# Patient Record
Sex: MALE | Race: WHITE | NOT HISPANIC OR LATINO | Employment: OTHER | URBAN - NONMETROPOLITAN AREA
[De-identification: names, ages, dates, MRNs, and addresses within clinical notes are randomized per-mention and may not be internally consistent; named-entity substitution may affect disease eponyms.]

---

## 2023-04-23 ENCOUNTER — APPOINTMENT (EMERGENCY)
Dept: CT IMAGING | Facility: HOSPITAL | Age: 63
End: 2023-04-23

## 2023-04-23 ENCOUNTER — HOSPITAL ENCOUNTER (EMERGENCY)
Facility: HOSPITAL | Age: 63
End: 2023-04-24
Attending: EMERGENCY MEDICINE

## 2023-04-23 DIAGNOSIS — S30.1XXA HEMATOMA OF RIGHT FLANK, INITIAL ENCOUNTER: Primary | ICD-10-CM

## 2023-04-23 LAB
ABO GROUP BLD: NORMAL
ABO GROUP BLD: NORMAL
ANION GAP SERPL CALCULATED.3IONS-SCNC: 7 MMOL/L (ref 4–13)
BASOPHILS # BLD AUTO: 0.06 THOUSANDS/ΜL (ref 0–0.1)
BASOPHILS # BLD AUTO: 0.06 THOUSANDS/ΜL (ref 0–0.1)
BASOPHILS NFR BLD AUTO: 0 % (ref 0–1)
BASOPHILS NFR BLD AUTO: 1 % (ref 0–1)
BLD GP AB SCN SERPL QL: NEGATIVE
BUN SERPL-MCNC: 12 MG/DL (ref 5–25)
CALCIUM SERPL-MCNC: 9.6 MG/DL (ref 8.4–10.2)
CHLORIDE SERPL-SCNC: 104 MMOL/L (ref 96–108)
CO2 SERPL-SCNC: 28 MMOL/L (ref 21–32)
CREAT SERPL-MCNC: 0.94 MG/DL (ref 0.6–1.3)
EOSINOPHIL # BLD AUTO: 0.07 THOUSAND/ΜL (ref 0–0.61)
EOSINOPHIL # BLD AUTO: 0.12 THOUSAND/ΜL (ref 0–0.61)
EOSINOPHIL NFR BLD AUTO: 1 % (ref 0–6)
EOSINOPHIL NFR BLD AUTO: 1 % (ref 0–6)
ERYTHROCYTE [DISTWIDTH] IN BLOOD BY AUTOMATED COUNT: 14.6 % (ref 11.6–15.1)
ERYTHROCYTE [DISTWIDTH] IN BLOOD BY AUTOMATED COUNT: 14.6 % (ref 11.6–15.1)
GFR SERPL CREATININE-BSD FRML MDRD: 86 ML/MIN/1.73SQ M
GLUCOSE SERPL-MCNC: 127 MG/DL (ref 65–140)
HCT VFR BLD AUTO: 37.7 % (ref 36.5–49.3)
HCT VFR BLD AUTO: 38.4 % (ref 36.5–49.3)
HGB BLD-MCNC: 12.5 G/DL (ref 12–17)
HGB BLD-MCNC: 12.8 G/DL (ref 12–17)
IMM GRANULOCYTES # BLD AUTO: 0.06 THOUSAND/UL (ref 0–0.2)
IMM GRANULOCYTES # BLD AUTO: 0.07 THOUSAND/UL (ref 0–0.2)
IMM GRANULOCYTES NFR BLD AUTO: 1 % (ref 0–2)
IMM GRANULOCYTES NFR BLD AUTO: 1 % (ref 0–2)
LYMPHOCYTES # BLD AUTO: 1.26 THOUSANDS/ΜL (ref 0.6–4.47)
LYMPHOCYTES # BLD AUTO: 1.42 THOUSANDS/ΜL (ref 0.6–4.47)
LYMPHOCYTES NFR BLD AUTO: 13 % (ref 14–44)
LYMPHOCYTES NFR BLD AUTO: 9 % (ref 14–44)
MCH RBC QN AUTO: 29.9 PG (ref 26.8–34.3)
MCH RBC QN AUTO: 30 PG (ref 26.8–34.3)
MCHC RBC AUTO-ENTMCNC: 33.2 G/DL (ref 31.4–37.4)
MCHC RBC AUTO-ENTMCNC: 33.3 G/DL (ref 31.4–37.4)
MCV RBC AUTO: 90 FL (ref 82–98)
MCV RBC AUTO: 90 FL (ref 82–98)
MONOCYTES # BLD AUTO: 0.61 THOUSAND/ΜL (ref 0.17–1.22)
MONOCYTES # BLD AUTO: 0.67 THOUSAND/ΜL (ref 0.17–1.22)
MONOCYTES NFR BLD AUTO: 5 % (ref 4–12)
MONOCYTES NFR BLD AUTO: 6 % (ref 4–12)
NEUTROPHILS # BLD AUTO: 11.72 THOUSANDS/ΜL (ref 1.85–7.62)
NEUTROPHILS # BLD AUTO: 8.9 THOUSANDS/ΜL (ref 1.85–7.62)
NEUTS SEG NFR BLD AUTO: 78 % (ref 43–75)
NEUTS SEG NFR BLD AUTO: 84 % (ref 43–75)
NRBC BLD AUTO-RTO: 0 /100 WBCS
NRBC BLD AUTO-RTO: 0 /100 WBCS
PLATELET # BLD AUTO: 213 THOUSANDS/UL (ref 149–390)
PLATELET # BLD AUTO: 223 THOUSANDS/UL (ref 149–390)
PMV BLD AUTO: 9.4 FL (ref 8.9–12.7)
PMV BLD AUTO: 9.5 FL (ref 8.9–12.7)
POTASSIUM SERPL-SCNC: 3.4 MMOL/L (ref 3.5–5.3)
RBC # BLD AUTO: 4.18 MILLION/UL (ref 3.88–5.62)
RBC # BLD AUTO: 4.27 MILLION/UL (ref 3.88–5.62)
RH BLD: NEGATIVE
RH BLD: NEGATIVE
SODIUM SERPL-SCNC: 139 MMOL/L (ref 135–147)
SPECIMEN EXPIRATION DATE: NORMAL
WBC # BLD AUTO: 11.17 THOUSAND/UL (ref 4.31–10.16)
WBC # BLD AUTO: 13.85 THOUSAND/UL (ref 4.31–10.16)

## 2023-04-23 RX ORDER — DESMOPRESSIN ACETATE 4 UG/ML
2 INJECTION, SOLUTION INTRAVENOUS; SUBCUTANEOUS ONCE
Status: DISCONTINUED | OUTPATIENT
Start: 2023-04-23 | End: 2023-04-23

## 2023-04-23 RX ORDER — MORPHINE SULFATE 4 MG/ML
4 INJECTION, SOLUTION INTRAMUSCULAR; INTRAVENOUS ONCE
Status: COMPLETED | OUTPATIENT
Start: 2023-04-23 | End: 2023-04-23

## 2023-04-23 RX ORDER — DESMOPRESSIN ACETATE 4 UG/ML
INJECTION, SOLUTION INTRAVENOUS; SUBCUTANEOUS
Status: DISCONTINUED
Start: 2023-04-23 | End: 2023-04-24 | Stop reason: HOSPADM

## 2023-04-23 RX ADMIN — MORPHINE SULFATE 4 MG: 4 INJECTION, SOLUTION INTRAMUSCULAR; INTRAVENOUS at 19:10

## 2023-04-23 RX ADMIN — IOHEXOL 100 ML: 350 INJECTION, SOLUTION INTRAVENOUS at 19:15

## 2023-04-23 RX ADMIN — DESMOPRESSIN ACETATE 29.2 MCG: 4 SOLUTION INTRAVENOUS at 21:30

## 2023-04-23 NOTE — ED PROVIDER NOTES
History  Chief Complaint   Patient presents with   • Fall     Fall around 2:30 this afternoon  States he was on the floor  Putting a piece of furniture together went to stand up and lost his balance and fell in to the piece of furniture  Patient has pain on his right side, and large firm hematoma to his right side, and and abrasion as well  Takes aspirin daily  Denies head strike      42-year-old male, presents with injury to right flank  Patient states he was on the floor putting together a piece of furniture, lost his balance and fell into piece of furniture  Denies any head injury or loss consciousness  Injury occurred at around 2:30 PM today  Patient reports pain to right lateral abdominal wall, flank  Denies any other known injury, has been able to ambulate since injury  History provided by:  Patient   used: No    Fall  Associated symptoms: no nausea and no vomiting        None       History reviewed  No pertinent past medical history  History reviewed  No pertinent surgical history  History reviewed  No pertinent family history  I have reviewed and agree with the history as documented  E-Cigarette/Vaping     E-Cigarette/Vaping Substances     Social History     Tobacco Use   • Smoking status: Never   • Smokeless tobacco: Never       Review of Systems   Constitutional: Negative  Respiratory: Negative  Gastrointestinal: Negative for nausea and vomiting  Neurological: Negative  Physical Exam  Physical Exam  Vitals and nursing note reviewed  Constitutional:       General: He is not in acute distress  HENT:      Head: Normocephalic and atraumatic  Eyes:      Extraocular Movements: Extraocular movements intact  Pupils: Pupils are equal, round, and reactive to light  Cardiovascular:      Rate and Rhythm: Normal rate and regular rhythm  Pulmonary:      Effort: Pulmonary effort is normal       Breath sounds: Normal breath sounds     Chest:      Chest wall: No deformity or tenderness  Abdominal:      Comments: Ecchymosis with tenderness to right lower lateral abdominal wall, flank  Overlying superficial abrasion  No right upper quadrant tenderness   Musculoskeletal:         General: Normal range of motion  Cervical back: Normal range of motion and neck supple  No tenderness  Skin:     General: Skin is warm and dry  Comments: Abrasion to right lower lateral abdominal wall, flank   Neurological:      General: No focal deficit present  Mental Status: He is alert and oriented to person, place, and time  Motor: No weakness        Gait: Gait normal          Vital Signs  ED Triage Vitals   Temperature Pulse Respirations Blood Pressure SpO2   04/23/23 1814 04/23/23 1814 04/23/23 1814 04/23/23 1814 04/23/23 1814   97 9 °F (36 6 °C) 60 18 150/93 95 %      Temp Source Heart Rate Source Patient Position - Orthostatic VS BP Location FiO2 (%)   04/23/23 1814 04/23/23 1814 04/23/23 1814 04/23/23 1814 --   Temporal Monitor Lying Right arm       Pain Score       04/23/23 1910       8           Vitals:    04/23/23 1814 04/23/23 1930 04/23/23 2000 04/23/23 2030   BP: 150/93 135/75 142/77 144/80   Pulse: 60 62 63 65   Patient Position - Orthostatic VS: Lying Lying Lying Lying         Visual Acuity      ED Medications  Medications   desmopressin (DDAVP) 29 2 mcg in sodium chloride 0 9 % 50 mL IVPB (has no administration in time range)   morphine injection 4 mg (4 mg Intravenous Given 4/23/23 1910)   iohexol (OMNIPAQUE) 350 MG/ML injection (SINGLE-DOSE) 100 mL (100 mL Intravenous Given 4/23/23 1915)       Diagnostic Studies  Results Reviewed     Procedure Component Value Units Date/Time    CBC and differential [[id number]]     Lab Status: No result Specimen: Blood     Basic metabolic panel [[id number]]  (Abnormal) Collected: 04/23/23 1827    Lab Status: Final result Specimen: Blood from Arm, Left Updated: 04/23/23 1852     Sodium 139 mmol/L      Potassium 3 4 mmol/L      Chloride 104 mmol/L      CO2 28 mmol/L      ANION GAP 7 mmol/L      BUN 12 mg/dL      Creatinine 0 94 mg/dL      Glucose 127 mg/dL      Calcium 9 6 mg/dL      eGFR 86 ml/min/1 73sq m     Narrative:      [place] guidelines for Chronic Kidney Disease (CKD):   •  Stage 1 with normal or high GFR (GFR > 90 mL/min/1 73 square meters)  •  Stage 2 Mild CKD (GFR = 60-89 mL/min/1 73 square meters)  •  Stage 3A Moderate CKD (GFR = 45-59 mL/min/1 73 square meters)  •  Stage 3B Moderate CKD (GFR = 30-44 mL/min/1 73 square meters)  •  Stage 4 Severe CKD (GFR = 15-29 mL/min/1 73 square meters)  •  Stage 5 End Stage CKD (GFR <15 mL/min/1 73 square meters)  Note: GFR calculation is accurate only with a steady state creatinine    CBC and differential [[id number]]  (Abnormal) Collected: 04/23/23 1827    Lab Status: Final result Specimen: Blood from Arm, Left Updated: 04/23/23 1837     WBC 11 17 Thousand/uL      RBC 4 27 Million/uL      Hemoglobin 12 8 g/dL      Hematocrit 38 4 %      MCV 90 fL      MCH 30 0 pg      MCHC 33 3 g/dL      RDW 14 6 %      MPV 9 5 fL      Platelets 695 Thousands/uL      nRBC 0 /100 WBCs      Neutrophils Relative 78 %      Immat GRANS % 1 %      Lymphocytes Relative 13 %      Monocytes Relative 6 %      Eosinophils Relative 1 %      Basophils Relative 1 %      Neutrophils Absolute 8 90 Thousands/µL      Immature Grans Absolute 0 06 Thousand/uL      Lymphocytes Absolute 1 42 Thousands/µL      Monocytes Absolute 0 61 Thousand/µL      Eosinophils Absolute 0 12 Thousand/µL      Basophils Absolute 0 06 Thousands/µL                  CT abdomen pelvis with contrast   Final Result by Joselin Martin MD (04/23 2036)      10 3 x 5 4 x 7 7 cm hematoma within subcutaneous soft tissue of the right flank with areas of active contrast extravasation      Bladder wall thickening which may related to underdistention, trabecular hypertrophy secondary to outlet obstruction or in appropriate clinical context cystitis  The study was marked in [facility] for immediate notification  Workstation performed: [id number]                    Procedures  CriticalCare Time    Date/Time: 4/23/2023 9:08 PM  Performed by: Cande Galicia MD  Authorized by: Cande Galicia MD     Critical care provider statement:     Critical care time (minutes):  40    Critical care time was exclusive of:  Separately billable procedures and treating other patients    Critical care was necessary to treat or prevent imminent or life-threatening deterioration of the following conditions:  Trauma    Critical care was time spent personally by me on the following activities:  Obtaining history from patient or surrogate, development of treatment plan with patient or surrogate, discussions with consultants, evaluation of patient's response to treatment, ordering and performing treatments and interventions, ordering and review of laboratory studies, ordering and review of radiographic studies and re-evaluation of patient's condition             ED Course  ED Course as of 04/23/23 2110   Sun Apr 23, 2023 2010 CT scan independently evaluated by myself, hematoma noted in right flank, pending radiology read  2046 Radiology read of CT scan reviewed and discussed with patient, will discuss with trauma surgery  2058 Discussed with trauma surgeon Dr Doug De Souza  Recommends giving DDAVP, abdominal binder, obtaining repeat hemoglobin  If patient having significant drop in hemoglobin, would need to be transferred emergently, if hemoglobin stable can be transferred by ambulance  2059 Patient currently awake and alert, comfortable and in no distress, discussed findings with patient and need to be transferred to trauma center  SBIRT 20yo+    Flowsheet Row Most Recent Value   Initial Alcohol Screen: US AUDIT-C     1  How often do you have a drink containing alcohol? 0 Filed at: 04/23/2023 1815   2   How many drinks containing alcohol do you have on a typical day you are drinking? 0 Filed at: 04/23/2023 1815   3b  FEMALE Any Age, or MALE 65+: How often do you have 4 or more drinks on one occassion? 0 Filed at: 04/23/2023 1815   Audit-C Score 0 Filed at: 04/23/2023 1815   KENDRICK: How many times in the past year have you    Used an illegal drug or used a prescription medication for non-medical reasons? Never Filed at: 04/23/2023 1815                    Medical Decision Making  27-year-old male, presented with injury to right lateral abdominal wall  Differential diagnosis includes contusion, traumatic intra-abdominal injury, retroperitoneal hematoma among other diagnoses  Patient appears comfortable in no distress, able to ambulate in ED  Labs and CT scan ordered, will continue to monitor and reevaluate  Patient be transferred to [place] for further trauma evaluation and management  Amount and/or Complexity of Data Reviewed  Labs: ordered  Decision-making details documented in ED Course  Radiology: ordered  Decision-making details documented in ED Course  Risk  Prescription drug management  Disposition  Final diagnoses:   Hematoma of right flank, initial encounter     Time reflects when diagnosis was documented in both MDM as applicable and the Disposition within this note     Time User Action Codes Description Comment    4/23/2023  9:01 PM Ronit Sheldon Add [S30 1XXA] Hematoma of right flank, initial encounter       ED Disposition     ED Disposition   Transfer to Another Facility-In Network    Condition   --    Date/Time   Sun Apr 23, 2023  9:00 PM    Comment   Rod Lion should be transferred out to [place]  Follow-up Information    None         Patient's Medications    No medications on file       No discharge procedures on file      PDMP Review     None          ED Provider  Electronically Signed by           Tomas Rodriguez MD  04/23/23 2517

## 2023-04-24 ENCOUNTER — HOSPITAL ENCOUNTER (INPATIENT)
Facility: HOSPITAL | Age: 63
LOS: 1 days | Discharge: HOME | End: 2023-04-24
Attending: SURGERY | Admitting: SURGERY

## 2023-04-24 VITALS
BODY MASS INDEX: 35.78 KG/M2 | WEIGHT: 214.73 LBS | DIASTOLIC BLOOD PRESSURE: 79 MMHG | RESPIRATION RATE: 16 BRPM | OXYGEN SATURATION: 92 % | SYSTOLIC BLOOD PRESSURE: 116 MMHG | TEMPERATURE: 97.9 F | HEART RATE: 64 BPM | HEIGHT: 65 IN

## 2023-04-24 VITALS
RESPIRATION RATE: 14 BRPM | OXYGEN SATURATION: 96 % | SYSTOLIC BLOOD PRESSURE: 129 MMHG | DIASTOLIC BLOOD PRESSURE: 71 MMHG | BODY MASS INDEX: 35.73 KG/M2 | HEART RATE: 66 BPM | HEIGHT: 65 IN | TEMPERATURE: 97.9 F

## 2023-04-24 DIAGNOSIS — I63.9 CEREBROVASCULAR ACCIDENT (CVA), UNSPECIFIED MECHANISM (HCC): ICD-10-CM
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
## 2023-04-24 PROBLEM — T14.8XXA HEMATOMA: Status: ACTIVE | Noted: 2023-04-24

## 2023-04-24 LAB
ABO GROUP BLD: NORMAL
ALBUMIN SERPL BCP-MCNC: 3.5 G/DL (ref 3.5–5)
ALP SERPL-CCNC: 144 U/L (ref 46–116)
ALT SERPL W P-5'-P-CCNC: 31 U/L (ref 12–78)
ANION GAP SERPL CALCULATED.3IONS-SCNC: 7 MMOL/L (ref 4–13)
AST SERPL W P-5'-P-CCNC: 16 U/L (ref 5–45)
BASOPHILS # BLD AUTO: 0.04 THOUSANDS/ÂΜL (ref 0–0.1)
BASOPHILS # BLD AUTO: 0.04 THOUSANDS/ÂΜL (ref 0–0.1)
BASOPHILS NFR BLD AUTO: 1 % (ref 0–1)
BASOPHILS NFR BLD AUTO: 1 % (ref 0–1)
BILIRUB SERPL-MCNC: 1.81 MG/DL (ref 0.2–1)
BLD GP AB SCN SERPL QL: NEGATIVE
BUN SERPL-MCNC: 14 MG/DL (ref 5–25)
CALCIUM SERPL-MCNC: 9.3 MG/DL (ref 8.3–10.1)
CHLORIDE SERPL-SCNC: 107 MMOL/L (ref 96–108)
CO2 SERPL-SCNC: 26 MMOL/L (ref 21–32)
CREAT SERPL-MCNC: 0.96 MG/DL (ref 0.6–1.3)
EOSINOPHIL # BLD AUTO: 0.07 THOUSAND/ÂΜL (ref 0–0.61)
EOSINOPHIL # BLD AUTO: 0.08 THOUSAND/ÂΜL (ref 0–0.61)
EOSINOPHIL NFR BLD AUTO: 1 % (ref 0–6)
EOSINOPHIL NFR BLD AUTO: 1 % (ref 0–6)
ERYTHROCYTE [DISTWIDTH] IN BLOOD BY AUTOMATED COUNT: 15 % (ref 11.6–15.1)
ERYTHROCYTE [DISTWIDTH] IN BLOOD BY AUTOMATED COUNT: 15.2 % (ref 11.6–15.1)
GFR SERPL CREATININE-BSD FRML MDRD: 84 ML/MIN/1.73SQ M
GLUCOSE SERPL-MCNC: 115 MG/DL (ref 65–140)
HCT VFR BLD AUTO: 33.9 % (ref 36.5–49.3)
HCT VFR BLD AUTO: 33.9 % (ref 36.5–49.3)
HCT VFR BLD AUTO: 35.6 % (ref 36.5–49.3)
HGB BLD-MCNC: 10.8 G/DL (ref 12–17)
HGB BLD-MCNC: 11 G/DL (ref 12–17)
HGB BLD-MCNC: 11.8 G/DL (ref 12–17)
IMM GRANULOCYTES # BLD AUTO: 0.02 THOUSAND/UL (ref 0–0.2)
IMM GRANULOCYTES # BLD AUTO: 0.05 THOUSAND/UL (ref 0–0.2)
IMM GRANULOCYTES NFR BLD AUTO: 0 % (ref 0–2)
IMM GRANULOCYTES NFR BLD AUTO: 1 % (ref 0–2)
INR PPP: 1.14 (ref 0.84–1.19)
LYMPHOCYTES # BLD AUTO: 1.61 THOUSANDS/ÂΜL (ref 0.6–4.47)
LYMPHOCYTES # BLD AUTO: 1.73 THOUSANDS/ÂΜL (ref 0.6–4.47)
LYMPHOCYTES NFR BLD AUTO: 19 % (ref 14–44)
LYMPHOCYTES NFR BLD AUTO: 23 % (ref 14–44)
MAGNESIUM SERPL-MCNC: 2 MG/DL (ref 1.6–2.6)
MCH RBC QN AUTO: 29.2 PG (ref 26.8–34.3)
MCH RBC QN AUTO: 29.3 PG (ref 26.8–34.3)
MCHC RBC AUTO-ENTMCNC: 31.9 G/DL (ref 31.4–37.4)
MCHC RBC AUTO-ENTMCNC: 32.4 G/DL (ref 31.4–37.4)
MCV RBC AUTO: 90 FL (ref 82–98)
MCV RBC AUTO: 92 FL (ref 82–98)
MONOCYTES # BLD AUTO: 0.49 THOUSAND/ÂΜL (ref 0.17–1.22)
MONOCYTES # BLD AUTO: 0.57 THOUSAND/ÂΜL (ref 0.17–1.22)
MONOCYTES NFR BLD AUTO: 6 % (ref 4–12)
MONOCYTES NFR BLD AUTO: 7 % (ref 4–12)
NEUTROPHILS # BLD AUTO: 5.29 THOUSANDS/ÂΜL (ref 1.85–7.62)
NEUTROPHILS # BLD AUTO: 5.95 THOUSANDS/ÂΜL (ref 1.85–7.62)
NEUTS SEG NFR BLD AUTO: 69 % (ref 43–75)
NEUTS SEG NFR BLD AUTO: 71 % (ref 43–75)
NRBC BLD AUTO-RTO: 0 /100 WBCS
NRBC BLD AUTO-RTO: 0 /100 WBCS
PLATELET # BLD AUTO: 201 THOUSANDS/UL (ref 149–390)
PLATELET # BLD AUTO: 204 THOUSANDS/UL (ref 149–390)
PMV BLD AUTO: 9.4 FL (ref 8.9–12.7)
PMV BLD AUTO: 9.6 FL (ref 8.9–12.7)
POTASSIUM SERPL-SCNC: 3.3 MMOL/L (ref 3.5–5.3)
PROT SERPL-MCNC: 7.2 G/DL (ref 6.4–8.4)
PROTHROMBIN TIME: 14.9 SECONDS (ref 11.6–14.5)
RBC # BLD AUTO: 3.7 MILLION/UL (ref 3.88–5.62)
RBC # BLD AUTO: 3.75 MILLION/UL (ref 3.88–5.62)
RH BLD: NEGATIVE
SODIUM SERPL-SCNC: 140 MMOL/L (ref 135–147)
SPECIMEN EXPIRATION DATE: NORMAL
WBC # BLD AUTO: 7.65 THOUSAND/UL (ref 4.31–10.16)
WBC # BLD AUTO: 8.29 THOUSAND/UL (ref 4.31–10.16)

## 2023-04-24 RX ORDER — ATORVASTATIN CALCIUM 20 MG/1
20 TABLET, FILM COATED ORAL
Status: DISCONTINUED | OUTPATIENT
Start: 2023-04-24 | End: 2023-04-24 | Stop reason: HOSPADM

## 2023-04-24 RX ORDER — ATORVASTATIN CALCIUM 20 MG/1
20 TABLET, FILM COATED ORAL DAILY
COMMUNITY

## 2023-04-24 RX ORDER — TRAZODONE HYDROCHLORIDE 100 MG/1
100 TABLET ORAL
COMMUNITY

## 2023-04-24 RX ORDER — DOXAZOSIN 2 MG/1
2 TABLET ORAL
Status: DISCONTINUED | OUTPATIENT
Start: 2023-04-24 | End: 2023-04-24 | Stop reason: HOSPADM

## 2023-04-24 RX ORDER — LABETALOL 100 MG/1
50 TABLET, FILM COATED ORAL 2 TIMES DAILY
COMMUNITY

## 2023-04-24 RX ORDER — PREGABALIN 50 MG/1
50 CAPSULE ORAL DAILY
COMMUNITY

## 2023-04-24 RX ORDER — POTASSIUM CHLORIDE 20 MEQ/1
40 TABLET, EXTENDED RELEASE ORAL ONCE
Status: COMPLETED | OUTPATIENT
Start: 2023-04-24 | End: 2023-04-24

## 2023-04-24 RX ORDER — OMEPRAZOLE 20 MG/1
20 CAPSULE, DELAYED RELEASE ORAL DAILY
COMMUNITY

## 2023-04-24 RX ORDER — ACETAMINOPHEN 325 MG/1
650 TABLET ORAL EVERY 4 HOURS PRN
Status: DISCONTINUED | OUTPATIENT
Start: 2023-04-24 | End: 2023-04-24 | Stop reason: HOSPADM

## 2023-04-24 RX ORDER — ASPIRIN 81 MG/1
81 TABLET, CHEWABLE ORAL DAILY
Status: ON HOLD | COMMUNITY
End: 2023-04-24 | Stop reason: SDUPTHER

## 2023-04-24 RX ORDER — ENOXAPARIN SODIUM 100 MG/ML
30 INJECTION SUBCUTANEOUS EVERY 12 HOURS SCHEDULED
Status: DISCONTINUED | OUTPATIENT
Start: 2023-04-24 | End: 2023-04-24 | Stop reason: HOSPADM

## 2023-04-24 RX ORDER — AMOXICILLIN 250 MG
1 CAPSULE ORAL
Status: DISCONTINUED | OUTPATIENT
Start: 2023-04-24 | End: 2023-04-24 | Stop reason: HOSPADM

## 2023-04-24 RX ORDER — HYDROMORPHONE HCL IN WATER/PF 6 MG/30 ML
0.2 PATIENT CONTROLLED ANALGESIA SYRINGE INTRAVENOUS EVERY 2 HOUR PRN
Status: DISCONTINUED | OUTPATIENT
Start: 2023-04-24 | End: 2023-04-24 | Stop reason: HOSPADM

## 2023-04-24 RX ORDER — POLYETHYLENE GLYCOL 3350 17 G/17G
17 POWDER, FOR SOLUTION ORAL DAILY
Status: DISCONTINUED | OUTPATIENT
Start: 2023-04-24 | End: 2023-04-24 | Stop reason: HOSPADM

## 2023-04-24 RX ORDER — LOSARTAN POTASSIUM 50 MG/1
50 TABLET ORAL DAILY
COMMUNITY

## 2023-04-24 RX ORDER — OXYCODONE HYDROCHLORIDE 5 MG/1
5 TABLET ORAL EVERY 4 HOURS PRN
Status: DISCONTINUED | OUTPATIENT
Start: 2023-04-24 | End: 2023-04-24 | Stop reason: HOSPADM

## 2023-04-24 RX ORDER — FLUOXETINE HYDROCHLORIDE 40 MG/1
40 CAPSULE ORAL DAILY
COMMUNITY

## 2023-04-24 RX ORDER — ACETAMINOPHEN 325 MG/1
650 TABLET ORAL EVERY 4 HOURS PRN
Refills: 0
Start: 2023-04-24

## 2023-04-24 RX ORDER — LABETALOL 100 MG/1
100 TABLET, FILM COATED ORAL EVERY 12 HOURS SCHEDULED
Status: DISCONTINUED | OUTPATIENT
Start: 2023-04-24 | End: 2023-04-24 | Stop reason: HOSPADM

## 2023-04-24 RX ORDER — DOXAZOSIN 2 MG/1
4 TABLET ORAL
COMMUNITY

## 2023-04-24 RX ORDER — ONDANSETRON 2 MG/ML
4 INJECTION INTRAMUSCULAR; INTRAVENOUS EVERY 4 HOURS PRN
Status: DISCONTINUED | OUTPATIENT
Start: 2023-04-24 | End: 2023-04-24 | Stop reason: HOSPADM

## 2023-04-24 RX ORDER — LOSARTAN POTASSIUM 50 MG/1
50 TABLET ORAL DAILY
Status: DISCONTINUED | OUTPATIENT
Start: 2023-04-24 | End: 2023-04-24 | Stop reason: HOSPADM

## 2023-04-24 RX ORDER — OXYCODONE HYDROCHLORIDE 5 MG/1
TABLET ORAL
Qty: 20 TABLET | Refills: 0 | Status: SHIPPED | OUTPATIENT
Start: 2023-04-24

## 2023-04-24 RX ORDER — AMOXICILLIN 250 MG
1 CAPSULE ORAL
Qty: 30 TABLET | Refills: 0 | Status: SHIPPED | OUTPATIENT
Start: 2023-04-24

## 2023-04-24 RX ORDER — FLUOXETINE HYDROCHLORIDE 20 MG/1
40 CAPSULE ORAL DAILY
Status: DISCONTINUED | OUTPATIENT
Start: 2023-04-24 | End: 2023-04-24 | Stop reason: HOSPADM

## 2023-04-24 RX ORDER — POTASSIUM CHLORIDE 20 MEQ/1
20 TABLET, EXTENDED RELEASE ORAL ONCE
Status: COMPLETED | OUTPATIENT
Start: 2023-04-24 | End: 2023-04-24

## 2023-04-24 RX ORDER — ASPIRIN 81 MG/1
81 TABLET, CHEWABLE ORAL DAILY
Refills: 0
Start: 2023-04-28

## 2023-04-24 RX ORDER — TRAZODONE HYDROCHLORIDE 100 MG/1
100 TABLET ORAL
Status: DISCONTINUED | OUTPATIENT
Start: 2023-04-24 | End: 2023-04-24 | Stop reason: HOSPADM

## 2023-04-24 RX ADMIN — OXYCODONE HYDROCHLORIDE 5 MG: 5 TABLET ORAL at 06:13

## 2023-04-24 RX ADMIN — LABETALOL HYDROCHLORIDE 100 MG: 100 TABLET, FILM COATED ORAL at 08:14

## 2023-04-24 RX ADMIN — FLUOXETINE 40 MG: 20 CAPSULE ORAL at 08:14

## 2023-04-24 RX ADMIN — POLYETHYLENE GLYCOL 3350 17 G: 17 POWDER, FOR SOLUTION ORAL at 08:14

## 2023-04-24 RX ADMIN — LOSARTAN POTASSIUM 50 MG: 50 TABLET, FILM COATED ORAL at 08:14

## 2023-04-24 RX ADMIN — ATORVASTATIN CALCIUM 20 MG: 20 TABLET, FILM COATED ORAL at 17:46

## 2023-04-24 RX ADMIN — POTASSIUM CHLORIDE 40 MEQ: 1500 TABLET, EXTENDED RELEASE ORAL at 08:14

## 2023-04-24 RX ADMIN — ENOXAPARIN SODIUM 30 MG: 30 INJECTION SUBCUTANEOUS at 12:49

## 2023-04-24 RX ADMIN — OXYCODONE HYDROCHLORIDE 5 MG: 5 TABLET ORAL at 12:49

## 2023-04-24 RX ADMIN — POTASSIUM CHLORIDE 20 MEQ: 1500 TABLET, EXTENDED RELEASE ORAL at 10:39

## 2023-04-24 NOTE — PLAN OF CARE
Problem: OCCUPATIONAL THERAPY ADULT  Goal: Performs self-care activities at highest level of function for planned discharge setting  See evaluation for individualized goals  Description: Treatment Interventions: ADL retraining, Functional transfer training, Endurance training, Cognitive reorientation, Patient/family training, Equipment evaluation/education, Compensatory technique education, Energy conservation, Activityengagement          See flowsheet documentation for full assessment, interventions and recommendations  Note: Limitation: Decreased ADL status, Decreased Safe judgement during ADL, Decreased cognition, Decreased endurance, Decreased self-care trans, Decreased high-level ADLs  Prognosis: Good  Assessment: 60 YO Male SEEN FOR INITIAL OCCUPATIONAL THERAPY EVALUATION FOLLOWING ADMISSION TO [facility] S/P FALL RESULTING IN RLQ HEMATOMA  PT WITH ABDOMINAL BINDER IN PLACE  PROBLEMS LIST/PMH INCLUDES COPD (chronic obstructive pulmonary disease) ([facility] ), Diabetes mellitus ([facility] ), Hypertension, and Pulmonary emboli ([facility] )  PT IS FROM HOME WITH S/O WHERE HE REPORTS BEING INDEPENDENT WITH ADLS/IADLS PTA  PT CURRENTLY REQUIRES OVERALL SUPERVISION-MIN A WITH ADLS, TRANSFERS AND FUNCTIONAL MOBILITY WITH USE OF RW  PT IS LIMITED 2' PAIN, FATIGUE, IMPAIRED BALANCE, FALL RISK , LIMITED SAFETY AWARENESS/INSIGHT/JUDGEMENT, OVERALL WEAKNESS/DECONDITIONING , INACCESSIBLE HOME ENVIRONMENT and OVERALL LIMITED ACTIVITY TOLERANCE  PT EDUCATED ON DEEP BREATHING TECHNIQUES T/O ACTIVITY, SLOWING OF PACE, ENERGY CONSERVATION TECHNIQUES FOR CARRY OVER UPON D/C, INCREASED FAMILY SUPPORT and CONTINUE PARTICIPATION IN SELF-CARE/MOBILITY WITH STAFF [address]   The patient's raw score on the AM-PAC Daily Activity Inpatient Short Form is 20  A raw score of greater than or equal to 19 suggests the patient may benefit from discharge to home   Please refer to the recommendation of the Occupational Therapist for safe discharge planning  FROM AN OCCUPATIONAL THERAPY PERSPECTIVE, PT CAN RETURN HOME WITH INCREASED FAMILY SUPPORT WHEN MEDICALLY CLEARED PENDING PT PROGRESS  WILL CONT TO FOLLOW TO ADDRESS THE BELOW DESCRIBED GOALS       OT Discharge Recommendation: No rehabilitation needs (HOME PENDING PROGRESS)

## 2023-04-24 NOTE — PLAN OF CARE
Problem: MOBILITY - ADULT  Goal: Maintain or return to baseline ADL function  Description: INTERVENTIONS:  -  Assess patient's ability to carry out ADLs; assess patient's baseline for ADL function and identify physical deficits which impact ability to perform ADLs (bathing, care of mouth/teeth, toileting, grooming, dressing, etc )  - Assess/evaluate cause of self-care deficits   - Assess range of motion  - Assess patient's mobility; develop plan if impaired  - Assess patient's need for assistive devices and provide as appropriate  - Encourage maximum independence but intervene and supervise when necessary  - Involve family in performance of ADLs  - Assess for home care needs following discharge   - Consider OT consult to assist with ADL evaluation and planning for discharge  - Provide patient education as appropriate  Outcome: Progressing  Goal: Maintains/Returns to pre admission functional level  Description: INTERVENTIONS:  - Perform BMAT or MOVE assessment daily    - Set and communicate daily mobility goal to care team and patient/family/caregiver  - Collaborate with rehabilitation services on mobility goals if consulted  - Perform Range of Motion    times a day  - Reposition patient every    hours    - Dangle patient    times a day  - Stand patient    times a day  - Ambulate patient    times a day  - Out of bed to chair    times a day   - Out of bed for meals    times a day  - Out of bed for toileting  - Record patient progress and toleration of activity level   Outcome: Progressing     Problem: PAIN - ADULT  Goal: Verbalizes/displays adequate comfort level or baseline comfort level  Description: Interventions:  - Encourage patient to monitor pain and request assistance  - Assess pain using appropriate pain scale  - Administer analgesics based on type and severity of pain and evaluate response  - Implement non-pharmacological measures as appropriate and evaluate response  - Consider cultural and social influences on pain and pain management  - Notify physician/advanced practitioner if interventions unsuccessful or patient reports new pain  Outcome: Progressing     Problem: INFECTION - ADULT  Goal: Absence or prevention of progression during hospitalization  Description: INTERVENTIONS:  - Assess and monitor for signs and symptoms of infection  - Monitor lab/diagnostic results  - Monitor all insertion sites, i e  indwelling lines, tubes, and drains  - Monitor endotracheal if appropriate and nasal secretions for changes in amount and color  - [place] appropriate cooling/warming therapies per order  - Administer medications as ordered  - Instruct and encourage patient and family to use good hand hygiene technique  - Identify and instruct in appropriate isolation precautions for identified infection/condition  Outcome: Progressing  Goal: Absence of fever/infection during neutropenic period  Description: INTERVENTIONS:  - Monitor WBC    Outcome: Progressing     Problem: SAFETY ADULT  Goal: Maintain or return to baseline ADL function  Description: INTERVENTIONS:  -  Assess patient's ability to carry out ADLs; assess patient's baseline for ADL function and identify physical deficits which impact ability to perform ADLs (bathing, care of mouth/teeth, toileting, grooming, dressing, etc )  - Assess/evaluate cause of self-care deficits   - Assess range of motion  - Assess patient's mobility; develop plan if impaired  - Assess patient's need for assistive devices and provide as appropriate  - Encourage maximum independence but intervene and supervise when necessary  - Involve family in performance of ADLs  - Assess for home care needs following discharge   - Consider OT consult to assist with ADL evaluation and planning for discharge  - Provide patient education as appropriate  Outcome: Progressing  Goal: Maintains/Returns to pre admission functional level  Description: INTERVENTIONS:  - Perform BMAT or MOVE assessment daily    - Set and communicate daily mobility goal to care team and patient/family/caregiver  - Collaborate with rehabilitation services on mobility goals if consulted  - Perform Range of Motion    times a day  - Reposition patient every    hours    - Dangle patient    times a day  - Stand patient    times a day  - Ambulate patient    times a day  - Out of bed to chair    times a day   - Out of bed for meals    times a day  - Out of bed for toileting  - Record patient progress and toleration of activity level   Outcome: Progressing  Goal: Patient will remain free of falls  Description: INTERVENTIONS:  - Educate patient/family on patient safety including physical limitations  - Instruct patient to call for assistance with activity   - Consult OT/PT to assist with strengthening/mobility   - Keep Call bell within reach  - Keep bed low and locked with side rails adjusted as appropriate  - Keep care items and personal belongings within reach  - Initiate and maintain comfort rounds  - Make Fall Risk Sign visible to staff  - Offer Toileting every  Hours, in advance of need  - Initiate/Maintain alarm  - Obtain necessary fall risk management equipment  - Apply yellow socks and bracelet for high fall risk patients  - Consider moving patient to room near nurses station  Outcome: Progressing     Problem: DISCHARGE PLANNING  Goal: Discharge to home or other facility with appropriate resources  Description: INTERVENTIONS:  - Identify barriers to discharge w/patient and caregiver  - Arrange for needed discharge resources and transportation as appropriate  - Identify discharge learning needs (meds, wound care, etc )  - Arrange for interpretive services to assist at discharge as needed  - Refer to Case Management Department for coordinating discharge planning if the patient needs post-hospital services based on physician/advanced practitioner order or complex needs related to functional status, cognitive ability, or social support system  Outcome: Progressing     Problem: Knowledge Deficit  Goal: Patient/family/caregiver demonstrates understanding of disease process, treatment plan, medications, and discharge instructions  Description: Complete learning assessment and assess knowledge base    Interventions:  - Provide teaching at level of understanding  - Provide teaching via preferred learning methods  Outcome: Progressing

## 2023-04-24 NOTE — OCCUPATIONAL THERAPY NOTE
Occupational Therapy Evaluation     Patient Name: Haily HAGEN Date: 4/24/2023  Problem List  Active Problems:    Hematoma    Past Medical History  Past Medical History:   Diagnosis Date    COPD [facility]chronic [facility] ([facility] )     Diabetes mellitus ([facility] )     Hypertension     Pulmonary emboli ([facility] )      Past Surgical History  Past Surgical History:   Procedure Laterality Date    APPENDECTOMY      EYE SURGERY      LAMINECTOMY           04/24/23 1024   OT Last Visit   OT Visit Date 04/24/23   Note Type   Note type Evaluation   Pain Assessment   Pain Assessment Tool 0-10   Pain Score 8   Pain Location/Orientation Orientation: Right;Location: Rib Cage   Patient's Stated Pain Goal No pain   Hospital Pain Intervention(s) Repositioned; Ambulation/increased activity; Emotional support   Restrictions/Precautions   Weight Bearing Precautions Per Order No   Braces or Orthoses   (abdominal binder)   Other Precautions Cognitive; Chair Alarm; Bed Alarm;Multiple lines; Fall Risk;Pain   Home Living   Type of [address] [place] [address] Two level;Bed/bath upstairs;Stairs to enter with rails  (5 HAYDEE)   Bathroom Shower/Tub Tub/shower unit   Bathroom Toilet Standard   Home Equipment Walker   Additional Comments OCCASIONAL USE OF RW AT BASELINE   Prior Function   Level of [place] Independent with ADLs; Independent with IADLS   Lives With Significant other   Receives Help From [facility] in the last 6 months 5 to 10   Vocational On disability   Lifestyle   Autonomy PT REPORTS BEING I WITH ADLS/IADLS   S/O ASSISTS AS NEEDED   Reciprocal Relationships LIVES WITH S/O WHO IS NOT HOME AT ALL TIMES   Service to Others ON DISABILITY   Intrinsic Gratification ENJOYS BEING INDEPENDENT   ADL   Eating Assistance 7  Independent   Grooming Assistance 5  Supervision/Setup   UB Bathing Assistance 5  Supervision/Setup   LB Bathing Assistance 4  Minimal Assistance   UB Dressing Assistance 5  Supervision/Setup   LB Dressing Assistance 4  Minimal Assistance   Toileting Assistance  4  Minimal Assistance   Functional Assistance 4  Minimal Assistance   Bed Mobility   Supine to Sit 4  Minimal assistance   Additional items Assist x 1; Increased time required;Verbal cues;LE management   Transfers   Sit to Stand 5  Supervision   Additional items Increased time required;Verbal cues   Stand to Sit 5  Supervision   Additional items Increased time required;Verbal cues   Functional Mobility   Functional Mobility 4  Minimal assistance   Additional items Rolling walker   Balance   Static Sitting Fair +   Static Standing Fair   Ambulatory Fair -   Activity Tolerance   Activity Tolerance Patient tolerated treatment well   Medical Staff Made Aware PT SEEN FOR CO-SESSION WITH SKILLED PHYSICAL THERAPIST 2' CLINICALLY UNSTABLE PRESENTATION, NEW PRECAUTIONS/LIMITATIONS, LIMITED ACTIVITY TOLERANCE AND PRESENT IMPAIRMENTS WHICH ARE A REGRESSION FROM THE PT'S BASELINE AND IMPACTING OVERALL OCCUPATIONAL PERFORMANCE  Nurse Made Aware APPROPRIATE TO SEE   RUE Assessment   RUE Assessment WFL   LUE Assessment   LUE Assessment WFL   Cognition   Overall Cognitive Status Impaired   Arousal/Participation Alert; Cooperative   Attention Attends with cues to redirect   Orientation Level Oriented to person;Oriented to place;Oriented to situation;Disoriented to time   Memory Decreased recall of biographical information;Decreased short term memory;Decreased recall of recent events   Following Commands Follows one step commands with increased time or repetition   Comments PT IS PLEASANT AND COOPERATIVE  QUESTIONABLE HISTORIAN AT TIMES  LIMITED INSIGHT/JUDGEMENT/SAFETY AWARENESS  ALARM ON FOR SAFETY   Assessment   Limitation Decreased ADL status; Decreased Safe judgement during ADL;Decreased cognition;Decreased endurance;Decreased self-care trans;Decreased high-level ADLs   Prognosis Good   Assessment 58 YO Male SEEN FOR INITIAL OCCUPATIONAL THERAPY EVALUATION FOLLOWING ADMISSION TO [facility] S/P FALL RESULTING IN RLQ HEMATOMA  PT WITH ABDOMINAL BINDER IN PLACE  PROBLEMS LIST/PMH INCLUDES COPD (chronic obstructive pulmonary disease) ([facility] ), Diabetes mellitus ([facility] ), Hypertension, and Pulmonary emboli ([facility] )  PT IS FROM HOME WITH S/O WHERE HE REPORTS BEING INDEPENDENT WITH ADLS/IADLS PTA  PT CURRENTLY REQUIRES OVERALL SUPERVISION-MIN A WITH ADLS, TRANSFERS AND FUNCTIONAL MOBILITY WITH USE OF RW  PT IS LIMITED 2' PAIN, FATIGUE, IMPAIRED BALANCE, FALL RISK , LIMITED SAFETY AWARENESS/INSIGHT/JUDGEMENT, OVERALL WEAKNESS/DECONDITIONING , INACCESSIBLE HOME ENVIRONMENT and OVERALL LIMITED ACTIVITY TOLERANCE  PT EDUCATED ON DEEP BREATHING TECHNIQUES T/O ACTIVITY, SLOWING OF PACE, ENERGY CONSERVATION TECHNIQUES FOR CARRY OVER UPON D/C, INCREASED FAMILY SUPPORT and CONTINUE PARTICIPATION IN SELF-CARE/MOBILITY WITH STAFF [address]   The patient's raw score on the AM-PAC Daily Activity Inpatient Short Form is 20  A raw score of greater than or equal to 19 suggests the patient may benefit from discharge to home  Please refer to the recommendation of the Occupational Therapist for safe discharge planning  FROM AN OCCUPATIONAL THERAPY PERSPECTIVE, PT CAN RETURN HOME WITH INCREASED FAMILY SUPPORT WHEN MEDICALLY CLEARED PENDING PT PROGRESS  WILL CONT TO FOLLOW TO ADDRESS THE BELOW DESCRIBED GOALS  Goals   Patient Goals TO RETURN HOME   LTG Time Frame 10-14   Long Term Goal #1 SEE BELOW   Plan   Treatment Interventions ADL retraining;Functional transfer training; Endurance training;Cognitive reorientation;Patient/family training;Equipment evaluation/education; Compensatory technique education; Energy conservation; Activityengagement   Goal Expiration Date 05/08/23   OT Frequency 3-5x/wk   Recommendation   OT Discharge Recommendation No rehabilitation needs  (HOME PENDING PROGRESS)   AM-PAC Daily Activity Inpatient   Lower Body Dressing 3   Bathing 3   Toileting 3   Upper Body Dressing 3   Grooming 4   Eating 4   Daily Activity Raw Score 20   Daily Activity Standardized Score (Calc for Raw Score >=11) 42 03   AM-PAC Applied Cognition Inpatient   Following a Speech/Presentation 3   Understanding Ordinary Conversation 4   Taking Medications 3   Remembering Where Things Are Placed or Put Away 3   Remembering List of 4-5 Errands 3   Taking Care of Complicated Tasks 3   Applied Cognition Raw Score 19   Applied Cognition Standardized Score 39 77   Modified [place] Scale   Modified Zach Scale 3       OCCUPATIONAL THERAPY GOALS TO BE MET WITHIN 14 DAYS:    -Pt will increase bed mobility to MOD I to participate in functional activities with G tolerance and balance  -Pt will improve functional mobility and transfers to MOD I on/off all surfaces w/ G balance/safety including toileting   -Pt will participate in lt grooming task with MOD I after set-up standing at sink ~3-5 minutes with G safety and balance  -Pt will increase independence in all ADLS to MOD I with G balance sitting upright in chair   -Pt will improve activity tolerance to G for 30 min txment sessions w/ G carry over of learned energy conservation techniques   -Pt will improve independence in lt homemaking activities to MOD I without requiring cues for safety   -Pt will demonstrate G carryover of learned safety techniques and proper body mechanics in functional and leisure activities with use of DME   -Pt will complete additional cognitive assessment with 100% attention to task in order to assist with safe d/c plan       Documentation completed by Ellan Lanes, [address], OTR/L  [facility] OF THE [facility] Certified ID# [id number]

## 2023-04-24 NOTE — INCIDENTAL FINDINGS
The following findings require follow up:  Radiographic finding   Finding: One or more sharply circumscribed subcentimeter renal hypodensities are present, too small to accurately characterize, and statistically most likely benign findings  Follow up required: According to recent   literature (Radiology 2019) no further workup of these findings is recommended  Follow up should be done within 1 week(s)    Please notify the following clinician to assist with the follow up:   Dr Galen Motta    These findings were discussed with patient who verbally confirmed understanding and states that he will follow-up with his PCP

## 2023-04-24 NOTE — ASSESSMENT & PLAN NOTE
-S/p fall while putting together furniture  Patient fell forward hitting his RLQ of the abdomen and flank  -CT AP with contrast: 10 3 x 5 4 x 7 7 cm hematoma within subcutaneous soft tissue of the right flank with areas of active contrast extravasation   -Patient takes Aspirin, reversed with DDAVP at [facility]  -Abdominal binder placed  -Monitor q4hr CBCs

## 2023-04-24 NOTE — H&P
[facility]  H&P  Name: Panda Lion 59 y o  male I MRN: [id number]  Unit/Bed#: ED 12 I Date of Admission: 4/24/2023   Date of Service: 4/24/2023 I Hospital Day: 0      Assessment/Plan   Hematoma  Assessment & Plan  -S/p fall while putting together furniture  Patient fell forward hitting his RLQ of the abdomen and flank  -CT AP with contrast: 10 3 x 5 4 x 7 7 cm hematoma within subcutaneous soft tissue of the right flank with areas of active contrast extravasation   -Patient takes Aspirin, reversed with DDAVP at [facility]  -Abdominal binder placed  -Monitor q4hr CBCs  Trauma Alert: Evaluation; trauma team notified at 05:45 via text   Model of Arrival: Ambulance    Trauma Team: Attending Janet Natarajan and Residents [place]  Consultants:     None     History of Present Illness     Chief Complaint: Abdominal and flank pain  Mechanism:Fall     HPI:    Sierra Juárez is a 59 y o  male who presents with hematoma with active extrav seen on imaging  Patient was assembling a TV stand at home yesterday afternoon  He tripped and fell forward on the partially assembled stand  He presented to [facility] where he was found to have 10 3 x 5 4 x 7 7 cm hematoma within subcutaneous soft tissue of the right flank with areas of active contrast extravasation on CT A/P  He received DDAVP for reversal of Aspirin and was placed in abdominal binder  He complains of continued abdominal pain at the site of the injury  He did not hit his head, has no pain or injuries elsewhere  Review of Systems   Gastrointestinal: Positive for abdominal pain  Skin: Positive for wound  All other systems reviewed and are negative  12-point, complete review of systems was reviewed and negative except as stated above  Historical Information     History reviewed  No pertinent past medical history  History reviewed  No pertinent surgical history       Social History     Tobacco Use   • Smoking status: Never   • Smokeless tobacco: Never   Substance Use Topics   • Alcohol use: Never   • Drug use: Never       There is no immunization history on file for this patient  Last Tetanus: patient states UTD  Family History: Non-contributory     Meds/Allergies   all current active meds have been reviewed   No Known Allergies    Objective   Initial Vitals:   Pulse: 66 (04/24/23 0545)  Respirations: 16 (04/24/23 0545)  Blood Pressure: 144/73 (04/24/23 0545)    Primary Survey:   Airway:        Status: patent;        Pre-hospital Interventions: none        Hospital Interventions: none  Breathing:        Pre-hospital Interventions: none       Effort: normal       Right breath sounds: normal       Left breath sounds: normal  Circulation:        Rhythm: regular       Rate: regular   Right Pulses Left Pulses    R radial: 2+  R femoral: 2+  R pedal: 2+     L radial: 2+  L femoral: 2+  L pedal: 2+       Disability:        GCS: Eye: 4; Verbal: 5 Motor: 6 Total: 15       Right Pupil: 4 mm;  round;  reactive         Left Pupil:  4 mm;  round;  reactive      R Motor Strength L Motor Strength    R : 5/5  R dorsiflex: 5/5  R plantarflex: 5/5 L : 5/5  L dorsiflex: 5/5  L plantarflex: 5/5        Sensory:  No sensory deficit  Exposure:       Completed: Yes      Secondary Survey:  Physical Exam  Vitals reviewed  Constitutional:       Appearance: Normal appearance  He is not ill-appearing  HENT:      Right Ear: Ear canal normal       Left Ear: Ear canal normal       Nose: Nose normal  No congestion or rhinorrhea  Mouth/Throat:      Mouth: Mucous membranes are moist       Pharynx: Oropharynx is clear  Eyes:      Extraocular Movements: Extraocular movements intact  Pupils: Pupils are equal, round, and reactive to light  Cardiovascular:      Rate and Rhythm: Normal rate and regular rhythm  Pulses: Normal pulses  Heart sounds: Normal heart sounds     Pulmonary:      Effort: Pulmonary effort is normal       Breath sounds: Normal breath sounds  No wheezing  Abdominal:      Tenderness: There is abdominal tenderness in the right lower quadrant  There is right CVA tenderness  There is no left CVA tenderness  Skin:     Capillary Refill: Capillary refill takes less than 2 seconds  Neurological:      Mental Status: He is alert and oriented to person, place, and time  Psychiatric:         Mood and Affect: Mood is anxious  Invasive Devices     Peripheral Intravenous Line  Duration           Peripheral IV 04/23/23 Left Antecubital <1 day    Peripheral IV 04/23/23 Right Antecubital <1 day              Lab Results: Results: I have personally reviewed all pertinent laboratory/tests results    Imaging Results: I have personally reviewed pertinent reports  Chest Xray(s): N/A   FAST exam(s): N/A   CT Scan(s): positive for acute findings: as indicated above  Additional Xray(s): N/A       Code Status: Level 1 - Full Code  Advance Directive and Living Will:      Power of :    POLST:    I have spent 30 minutes with Patient  today in which greater than 50% of this time was spent in counseling/coordination of care regarding Diagnostic results, Prognosis and Risks and benefits of tx options

## 2023-04-24 NOTE — CONSULTS
Consultation - Geriatric Medicine   Winston Lion 59 y o  male MRN: [id number]  [facility] Encounter: [id number]      Assessment/Plan     Ambulatory dysfunction with fall  -reportedly mechanical fall at home 4/23/23  -(-) head strike (-) loss of consciousness  -injuries as outlined below  -Requires use of cane for ambulation at baseline  -hx recurrent falls, reports at least 15 in past six months   -currently undergoing PT as o/p and being worked up for recurrent falls by PMR, pt reports that he is awaiting o/p MRI as part of this workup, has hx post laminectomy syndrome   -EMG 3/23 reports chronic polyradiculopathy of multiple lumbosacral regions   -consider checking orthostatics and possibly echo as warranted, monitor volume status   -cannot r/o medications such as Lyrica as contributing, could consider gradual dose reduction trial as o/p as appropriate, would not attempt in acute setting unless necessary due to too many confounding factors may not have clear result   -if not other etiology identified consider o/p referral to vestibular therapy   -remains high risk future falls due to age, hx fall, deconditioning/debility and unfamiliar environment   -encourage good body mechanics and assist with all transfers  -keep personal items and call bell close to prevent reaching  -maintain environment free of fall hazards  -encourage appropriate footwear and adequate lighting at all times when out of bed  -consider home fall risk assessment and personal fall alert system on returning home  -PT and OT pending     Right flank hematoma  -s/p fall as above  -CT chest 4/23 reports 10 3x5  4x7 7 cm hematoma of right flank with area active contrast extravasation   -Hb 11 from 1 8 on admission, currently trending H/H  -area outlined on skin by nursing on admit  -acute pain control  -abd binder in place     Acute pain due to trauma   -recommend pain control per Geriatric pain protocol:  Tylenol 975mg Q8H scheduled  Roxicodone 2 5mg Q4H PRN moderate pain  Roxicodone 5mg Q4H PRN severe pain  Dilaudid 0 2mg Q4H PRN  -consider adjuncts such as lidocaine patch topically  -encourage addition of non-pharmacologic pain treatment including ice and frequent repositioning  -recommend  bowel regimen to prevent and treat constipation due to increased risk with acute pain and opiate pain medications    Cognitive screening   -alert and oriented, reportedly independent with ADLs and iADLs at baseline  -endorses mild age related forgetfulness  -no prior cognitive testing on record for review  -no recent TSH on record for review, recommend checking with routine labs  -B12 somewhat low at 230, consider supplementation to goal over 400  -no CTH on admission for review, [facility]TH at [facility] 2/23 reports no acute abn but images not available for personal review  -consider comprehensive geriatric assessment as o/p following recovery from acute injuries  -encourage use sensory assist devices such as glasses all appropriate times to reduce risk sensory impairment from contributing to isolation, confusion, encephalopathy and more precipitous cognitive decline     B12 deficiency  -low end of normal at 230, target goal >400 kenn in setting of memory concerns and peripheral neuropathy   -recommend daily supplementation as above    Major depressive disorder   -appears to be maintained on fluoxetine chronically as o/p, monitor closely with use in older adult population as more prone to side effects   -consider o/p referral to counseling/support group as part of comprehensive multimodal treatment approach   -continue close follow-up with PCP for ongoing management     Impaired Vision  -recommend use of corrective lenses at all appropriate times  -encourage adequate lighting and encourage use of assistance with ambulation  -keep personal belongings close to person to avoid reaching  -encourage appropriate footwear at all times  -consider large font for printed materials provided to pt    Impaired mastication  -requires use upper and lower dentures, encourage use all appropriate times   -ensure meal consistency appropriate for abilities  -continue aspiration precautions    Deconditioning/debility/frailty  -clinical frailty scale stage IV, vulnerable  -multifactorial including age, major depression, hx PE and multitude of additional chronic medical co-morbidities in elderly individual with limited physiologic and metabolic reserve  -albumin 3 5, encourage well balanced nutrition, consider supplements between meals if oral intake poor  -continue optimization chronic conditions and address acute derangements as arise  -continue psychosocial supports and cares     Delirium precautions  -Patient is high risk of delirium due to age, fall, traumatic injuries, acute on chronic pain, hospitalization   -Initiate delirium precautions  -maintain normal sleep/wake cycle  -minimize overnight interruptions, group overnight vitals/labs/nursing checks as possible  -dim lights, close blinds and turn off tv to minimize stimulation and encourage sleep environment in evenings  -ensure that pain is well controlled   -monitor for fecal and urinary retention which may precipitate delirium  -encourage early mobilization and ambulation with assistance as cleared to safely do so  -provide frequent reorientation and redirection  -encourage family and friends at the bedside to help help calm patient if anxious  -minimize us of medications which may precipitate or worsen delirium such as tramadol, benzodiazepine, anticholinergics, and benadryl as possible   -redirect unwanted behaviors as first line    Home medication review   Despite repeated personal attempts unable to connect with staff at [facility] [phone] to confirm during stores regularly listed business hours, per o/p records most recent home regimen as of 3/23 is as follows but may not be complete if those records were not up to date:    ASA 81mg daily   Fluoxetine 40mg daily  Prilosec 2mg daily  Lyrica 50mg daily (PDMP checked)    Per PDMP also recently started on Tramadol 50mg BID filled once 3/30/23)    Care coordination: rounded with David Bledsoe (RN)    History of Present Illness   Physician Requesting Consult: Hu Adan MD  Reason for Consult / Principal Problem: Fall  Hx and PE limited by: N/A  Additional history obtained from: Chart review and patient evaluation    HPI: Shae Gallegos is a 59y o  year old male with BPH, major depressive, hypertension with history of hypertensive urgency, anxiety, migraine headaches, lumbar spinal stenosis with neurogenic claudication, pulmonary embolism, type 2 diabetes, lumbar postlaminectomy syndrome and chronic lower back pain who is admitted to the Trauma service with fall found to have flank hematoma on admission, he is being seen in consultation by Geriatrics for high risk developing delirium during hospitalization  Rosenda Benavides is seen and examined at bedside, he explains that he sustained a fall at home while moving furniture when he fell and struck his right side resulting in immediate pain prompting presentation to the [facility] ED where he was found to have large right flank hematoma with active extravasation, he was transferred to [facility] for evaluation by Trauma  He is seen and examined at bedside, he explains the fall leading to admission occurred yesterday and he has had severe pain in his flank since that time  He has history of recurrent falls with at least fifteen falls in the past six months  He notes that they mostly occur with episodes of lightheaded and dizziness, he notes that use of cane is helpful for stability sometimes,he is being worked up as outpatient for etiology  Currently he resides home with his girlfriend and is independent with ADLs and iADLs, he wears glasses for reading, uses upper and lower dentures, does not use hearing aids      Inpatient consult to Gerontology  Consult performed by: Khloe Christian DO  Consult ordered by: Manolo Brambila MD        Review of Systems   Constitutional: Negative  Negative for chills and fever  HENT: Positive for dental problem (denture)  Eyes: Positive for visual disturbance (glasses for reading)  Respiratory: Negative  Negative for shortness of breath  Cardiovascular: Negative  Negative for leg swelling  Gastrointestinal: Negative  Genitourinary: Negative  Musculoskeletal: Positive for back pain and gait problem  Skin: Negative  Neurological: Positive for dizziness, weakness, light-headedness and headaches  Hematological: Negative  Psychiatric/Behavioral: Negative  All other systems reviewed and are negative  Historical Information   Past Medical History:   Diagnosis Date   • COPD (chronic obstructive pulmonary disease) ([facility] [address] )    • [facility]    • Hypertension    • Pulmonary emboli (HCC)      Past Surgical History:   Procedure Laterality Date   • APPENDECTOMY     • EYE SURGERY     • LAMINECTOMY       Social History   Social History     Substance and Sexual Activity   Alcohol Use Never     Social History     Substance and Sexual Activity   Drug Use Never     Social History     Tobacco Use   Smoking Status Never   Smokeless Tobacco Never     Family History:   Family History   Problem Relation Age of Onset   • Cancer Father      Meds/Allergies   all current active meds have been reviewed    No Known Allergies    Objective   No intake or output data in the 24 hours ending 04/24/23 0746  Invasive Devices     Peripheral Intravenous Line  Duration           Peripheral IV 04/23/23 Left Antecubital <1 day    Peripheral IV 04/23/23 Right Antecubital <1 day              Physical Exam  Vitals and nursing note reviewed  Constitutional:       General: He is not in acute distress  Comments: Elderly male appears stated age   HENT:      Head: Normocephalic        Nose: Nose normal  Mouth/Throat:      Mouth: Mucous membranes are dry  Eyes:      General: No scleral icterus  Right eye: No discharge  Left eye: No discharge  Conjunctiva/sclera: Conjunctivae normal       Comments: Pupils equal round    Neck:      Comments: Phonation norm   Cardiovascular:      Rate and Rhythm: Normal rate  Pulses: Normal pulses  Pulmonary:      Effort: Pulmonary effort is normal  No respiratory distress  Comments: Saturating well on room air   Abdominal:      General: There is no distension  Palpations: Abdomen is soft  Tenderness: There is no abdominal tenderness  Musculoskeletal:      Cervical back: Neck supple  Right lower leg: No edema  Left lower leg: No edema  Comments: Reduced overall muscle mass    Skin:     General: Skin is warm and dry  Comments: Large right flank hematoma    Soft lump dorsum right foot   Neurological:      Mental Status: He is alert  Mental status is at baseline  Comments: Awake and alert, ans ques appropriately    Psychiatric:      Comments: Restless with near continuous movements upper and lower extremities despite being otherwise at rest        Lab Results:     I have personally reviewed pertinent lab result  I have personally reviewed the pertinent imaging study reports in PACS      Therapies:   PT: pending   OT: pending     VTE Prophylaxis: Sequential compression device (Venodyne)     Code Status: Level 1 - Full Code  Advance Directive and Living Will:      Power of :    POLST:      Family and Social Support: girlfriend     Goals of Care: reduce frequency of falls

## 2023-04-24 NOTE — EMTALA/ACUTE CARE TRANSFER
[address] [facility] EMERGENCY DEPARTMENT  [address] [place] [address] Tiffanie Araujo [id number]  Dept: [phone]      EMTALA TRANSFER CONSENT    NAME Marjorie Lion                                         1960                              MRN [id number]    I have been informed of my rights regarding examination, treatment, and transfer   by Dr Shauna Montez MD    Benefits:  Specialist not available at this facility    Risks:  Worsening of condition, discomfort during transport      Transfer Request   I acknowledge that my medical condition has been evaluated and explained to me by the emergency department physician or other qualified medical person and/or my attending physician who has recommended and offered to me further medical examination and treatment  I understand the Hospital's obligation with respect to the treatment and stabilization of my emergency medical condition  I nevertheless request to be transferred  I release the Hospital, the doctor, and any other persons caring for me from all responsibility or liability for any injury or ill effects that may result from my transfer and agree to accept all responsibility for the consequences of my choice to transfer, rather than receive stabilizing treatment at the Hospital  I understand that because the transfer is my request, my insurance may not provide reimbursement for the services  The Hospital will assist and direct me and my family in how to make arrangements for transfer, but the hospital is not liable for any fees charged by the transport service  In spite of this understanding, I refuse to consent to further medical examination and treatment which has been offered to me, and request transfer to [facility]    I authorize the performance of emergency medical procedures and treatments upon me in both transit and upon arrival at the receiving facility    Additionally, I authorize the release of any and all medical records to the receiving facility and request they be transported with me, if possible  I authorize the performance of emergency medical procedures and treatments upon me in both transit and upon arrival at the receiving facility  Additionally, I authorize the release of any and all medical records to the receiving facility and request they be transported with me, if possible  I understand that the safest mode of transportation during a medical emergency is an ambulance and that the Hospital advocates the use of this mode of transport  Risks of traveling to the receiving facility by car, including absence of medical control, life sustaining equipment, such as oxygen, and medical personnel has been explained to me and I fully understand them  (JAIMIE CORRECT BOX BELOW)  [  ]  I consent to the stated transfer and to be transported by ambulance/helicopter  [  ]  I consent to the stated transfer, but refuse transportation by ambulance and accept full responsibility for my transportation by car  I understand the risks of non-ambulance transfers and I exonerate the Hospital and its staff from any deterioration in my condition that results from this refusal     X___________________________________________    DATE  23  TIME________  Signature of patient or legally responsible individual signing on patient behalf           RELATIONSHIP TO PATIENT_________________________          Provider Certification    NAME Paige Lion                                        [facility] 1960                              MRN [id number]    A medical screening exam was performed on the above named patient  Based on the examination:    Condition Necessitating Transfer The encounter diagnosis was Hematoma of right flank, initial encounter      Patient Condition:  stable    Reason for Transfer:  Traumatic injury    Transfer Requirements: [address] [place] [address]  · Space available and qualified personnel available for treatment as acknowledged by  Dr Van Knott  · Agreed to accept transfer and to provide appropriate medical treatment as acknowledged by        Dr Van Knott  · Appropriate medical records of the examination and treatment of the patient are provided at the time of transfer   [facility] _______  · Transfer will be performed by qualified personnel from    and appropriate transfer equipment as required, including the use of necessary and appropriate life support measures  Provider Certification: I have examined the patient and explained the following risks and benefits of being transferred/refusing transfer to the patient/family:         Based on these reasonable risks and benefits to the patient and/or the unborn child(aidan), and based upon the information available at the time of the patient’s examination, I certify that the medical benefits reasonably to be expected from the provision of appropriate medical treatments at another medical facility outweigh the increasing risks, if any, to the individual’s medical condition, and in the case of labor to the unborn child, from effecting the transfer      X____________________________________________ DATE 04/23/23        TIME_______      ORIGINAL - SEND TO MEDICAL RECORDS   COPY - SEND WITH PATIENT DURING TRANSFER

## 2023-04-24 NOTE — PLAN OF CARE
Problem: PHYSICAL THERAPY ADULT  Goal: Performs mobility at highest level of function for planned discharge setting  See evaluation for individualized goals  Description: Treatment/Interventions: Functional transfer training, LE strengthening/ROM, Elevations, Therapeutic exercise, Endurance training, Patient/family training, Equipment eval/education, Bed mobility, Gait training, Spoke to nursing, OT  Equipment Recommended: Emma Cha       See flowsheet documentation for full assessment, interventions and recommendations  Note: Prognosis: Good  Problem List: Impaired balance, Decreased mobility, Pain, Decreased safety awareness  Assessment: Pt presented to B s/p R flank hematoma with active extravasation following fall on TV stand at home  PMH unremarkable  Pt being seen for high complexity PT evaluation due to ongoing medical management for primary diagnosis, decreased activity tolerance compared to baseline, and utilization of new assistive device  Pt reports living with significant other in a 2 SH with 5 HAYDEE and full flight of steps to access bedroom/bathroom on upper level  PTA pt was independent with ADLs and functional mobility with occasional use of RW, and receives assistance with IADLs from significant other  Pt has been attending OP PT for about 6 weeks to address balance dysfunction  Provided pt education on body mechanics during bed mobility and transfers and use of RW for amb  Pt performs bed mobility with Min Ax1, transfers with supervision, ambulation with Min Ax1 and use of RW, and performs stair negotiation with Min Ax1  Throughout mobility, pt demonstrates some impulsivity and requires VC for overall safety awareness  At conclusion of PT evaluation, pt was seated in chair with all needs within reach and chair alarm engaged  Pt presented at PT evaluation with decreased BLE strength, impaired static and dynamic balance, and reduced functional mobility capabilities in comparison to baseline   Due to the pt's functional mobility status, social support, and accessible home environment, PT d/c recommendation when medically cleared is home with continued OP PT services  Continue skilled acute care PT services to address impairments listed above  Barriers to Discharge: None  Barriers to Discharge Comments: Pt denies any concerns or questions regarding d/c to home  PT Discharge Recommendation: Home with outpatient rehabilitation (Continue)    See flowsheet documentation for full assessment

## 2023-04-24 NOTE — PHYSICAL THERAPY NOTE
PHYSICAL THERAPY EVALUATION          Patient Name: Leonard Su  NB'W Date: 4/24/2023 04/24/23 1025   PT Last Visit   PT Visit Date 04/24/23   Note Type   Note type Evaluation   Pain Assessment   Pain Assessment Tool 0-10   Pain Score 8   Pain Location/Orientation Orientation: Right;Location: Rib Cage   Effect of Pain on Daily Activities Decreased tolerance for mobility   Patient's Stated Pain Goal No pain   Hospital Pain Intervention(s) Repositioned; Ambulation/increased activity   Restrictions/Precautions   Weight Bearing Precautions Per Order No   Braces or Orthoses Other (Comment)  (Abdominal binder)   Other Precautions Chair Alarm; Bed Alarm; Fall Risk;Pain   Home Living   Type of [address] [place] [address] Two level;Stairs to enter with rails;Bed/bath upstairs  (5 HAYDEE, full flight to access bedroom/bathroom)   Bathroom Shower/Tub Tub/shower unit   Bathroom Toilet Standard   Bathroom Accessibility Accessible   Home Equipment Walker   Additional Comments Pt reports living with significant other in a 2  with 5 HAYDEE, full flight of steps to access bedroom/bathroom  Prior Function   Level of [place] Needs assistance with IADLS; Independent with ADLs; Independent with functional mobility   Lives With Significant other   Receives Help From Family   IADLs Family/Friend/Other provides transportation; Independent with meal prep; Independent with medication management   Falls in the last 6 months 5 to 10  (Unclear)   Vocational On disability   Comments Pt reports occasional use of RW for amb PTA   General   Family/Caregiver Present No   Cognition   Arousal/Participation Alert   Orientation Level Oriented to person;Oriented to place;Oriented to situation;Disoriented to time   Memory Decreased recall of biographical information   Following Commands Follows one step commands with increased time or repetition   Comments Pt pleasant "and cooperative, observed with some impulsivity and decreased safety awareness during functional mobility tasks  Subjective   Subjective \"My knees usually buckle on me\"   RLE Assessment   RLE Assessment WFL   Strength RLE   RLE Overall Strength 4-/5  (functionally assessed, observed to buckle following stair trial)   LLE Assessment   LLE Assessment WFL   Strength LLE   LLE Overall Strength 4/5  (functionally assessed)   Bed Mobility   Supine to Sit 4  Minimal assistance   Additional items Assist x 1;HOB elevated; Increased time required   Additional Comments Pt found supine at start of eval, left in bedside chair with all needs within reach   Transfers   Sit to Stand 5  Supervision   Additional items Verbal cues; Impulsive   Stand to Sit 5  Supervision   Additional items Armrests   Additional Comments with RW, VC for hand placement   Ambulation/Elevation   Gait pattern Decreased foot clearance; Inconsistent chloe; Step through pattern   Gait Assistance 4  Minimal assist   Additional items Assist x 1;Verbal cues  (CGA for overall safety and impulsivity)   Assistive Device Rolling walker   Distance 90'x2   Stair Management Assistance 4  Minimal assist   Additional items Assist x 1;Verbal cues  (CGA for overall safety and impulsivity)   Stair Management Technique Two rails; Alternating pattern   Number of Stairs 3   Ambulation/Elevation Additional Comments with RW, VC for walker progression  CGA for overall safety and impulsivity throughout amb and stair trial   Balance   Static Sitting Fair +   Dynamic Sitting Fair   Static Standing Fair   Dynamic Standing Fair -   Ambulatory Fair -   Endurance Deficit   Endurance Deficit No   Activity Tolerance   Activity Tolerance Patient tolerated treatment well   Medical Staff Made Aware Lois OT present for co-evaluation secondary to pt presentation, d/c planning   Lila Bella PT present for student observation   Nurse Made Aware clearance per RN   Assessment   Prognosis Good   Problem " List Impaired balance;Decreased mobility;Pain;Decreased safety awareness   Assessment Pt presented to [facility] s/p R flank hematoma with active extravasation following fall on TV stand at home  PMH unremarkable  Pt being seen for high complexity PT evaluation due to ongoing medical management for primary diagnosis, decreased activity tolerance compared to baseline, and utilization of new assistive device  Pt reports living with significant other in a 2 SH with 5 HAYDEE and full flight of steps to access bedroom/bathroom on upper level  PTA pt was independent with ADLs and functional mobility with occasional use of RW, and receives assistance with IADLs from significant other  Pt has been attending OP PT for about 6 weeks to address balance dysfunction  Provided pt education on body mechanics during bed mobility and transfers and use of RW for amb  Pt performs bed mobility with Min Ax1, transfers with supervision, ambulation with Min Ax1 and use of RW, and performs stair negotiation with Min Ax1  Throughout mobility, pt demonstrates some impulsivity and requires VC for overall safety awareness  At conclusion of PT evaluation, pt was seated in chair with all needs within reach and chair alarm engaged  Pt presented at PT evaluation with decreased BLE strength, impaired static and dynamic balance, and reduced functional mobility capabilities in comparison to baseline  Due to the pt's functional mobility status, social support, and accessible home environment, PT d/c recommendation when medically cleared is home with continued OP PT services  Continue skilled acute care PT services to address impairments listed above     Barriers to Discharge None   Barriers to Discharge Comments Pt denies any concerns or questions regarding d/c to home   Goals   Patient Goals To have less falls   STG Expiration Date 05/04/23   Short Term Goal #1 In 10 days pt will complete: 1) Bed mobility skills with Mod I to increase safety and independence as well as decrease caregiver burden  2) Functional transfers with Mod I to promote increased independence, safety, and QOL  3) Ambulate 300' using least restrictive AD with Mod I without LOB and stable vitals so that pt can negotiate previous living environment safely and promote independence with functional mobility and return to PLOF  4) Stair training up/ down 12+ step/s using rail/s with Mod I so that pt can enter/negotiate previous living environment safely and decrease fall risk  5) Improve balance grades by 1/2 grade to increase safety with all mobility and decrease fall risk  6) Improve BLE strength by 1/2 grade to help increase overall functional mobility and decrease fall risk   PT Treatment Day 0   Plan   Treatment/Interventions Functional transfer training;LE strengthening/ROM; Elevations; Therapeutic exercise; Endurance training;Patient/family training;Equipment eval/education; Bed mobility;Gait training;Spoke to nursing;OT   PT Frequency 3-5x/wk   Recommendation   PT Discharge Recommendation Home with outpatient rehabilitation  (Continue)   Equipment Recommended [facility] Recommended Wheeled walker   Change/add to [employer]?  No   Additional Comments Per pt already owns   [email or web address] [address] Inpatient   Turning in Flat Bed Without Bedrails 3   Lying on Back to Sitting on Edge of Flat Bed Without Bedrails 3   Moving Bed to Chair 3   Standing Up From Chair Using Arms 3   Walk in Room 3   Climb 3-5 Stairs With Railing 3   Basic Mobility Inpatient Raw Score 18   Basic Mobility Standardized Score 41 05   Highest Level Of Mobility   JH-HLM Goal 6: Walk 10 steps or more   JH-HLM Achieved 7: Walk 25 feet or more   Modified [place] Scale   Modified Zach Scale 3   Barthel Index   Feeding 10   Bathing 0   Grooming Score 5   Dressing Score 5   Bladder Score 10   Bowels Score 10   Toilet Use Score 5   Transfers (Bed/Chair) Score 10   Mobility (Level Surface) Score 10   Stairs Score 5   Barthel Index Score 70   End of Consult   Patient Position at End of Consult Bedside chair;Bed/Chair alarm activated; All needs within reach     [facility], [facility], [place]  04/24/23

## 2023-04-24 NOTE — ASSESSMENT & PLAN NOTE
- S/p fall while putting together furniture  Patient fell forward hitting his RLQ of the abdomen and flank    - CT AP with contrast: 10 3 x 5 4 x 7 7 cm hematoma within subcutaneous soft tissue of the right flank with areas of active contrast extravasation   - Patient takes Aspirin, reversed with DDAVP at [facility]  -Hemoglobin has displayed stability over the past several checks  Vital signs are stable  -Continue to wear abdominal binder and hold aspirin for 72 hours   -Follow-up with trauma in 2 weeks

## 2023-04-24 NOTE — DISCHARGE SUMMARY
"Pardeep Singh Lion 1960, 59 y o  male MRN: [id number]  Unit/Bed#: [facility] 626-01 Encounter: [id number]  Primary Care Provider: Jakob Tarango MD   Date and time admitted to hospital: 4/24/2023  5:45 AM    [address]  · Patient describes having mechanical fall  · He is currently being worked up for lower extremity weakness which is not present on examination by his primary care doctor  He may continue outpatient work-up  No indication based on physical examination for inpatient work-up  · PT/OT-recommended for outpatient therapy  Hematoma  Assessment & Plan  - S/p fall while putting together furniture  Patient fell forward hitting his RLQ of the abdomen and flank    - CT AP with contrast: 10 3 x 5 4 x 7 7 cm hematoma within subcutaneous soft tissue of the right flank with areas of active contrast extravasation   - Patient takes Aspirin, reversed with DDAVP at [facility]  -Hemoglobin has displayed stability over the past several checks  Vital signs are stable  -Continue to wear abdominal binder and hold aspirin for 72 hours   -Follow-up with trauma in 2 weeks  Medical Problems     Resolved Problems  Date Reviewed: 4/24/2023   None         Admission Date:   Admission Orders (From admission, onward)     Ordered        04/24/23 0556  Inpatient Admission  Once                        Admitting Diagnosis: Fall, initial encounter [W19  XXXA]  Unspecified multiple injuries, initial encounter [T07  XXXA]    HPI:  Hollis Lion is a 59 y o  male who presents with hematoma with active extrav seen on imaging  Patient was assembling a TV stand at home yesterday afternoon  He tripped and fell forward on the partially assembled stand  He presented to [facility] where he was found to have 10 3 x 5 4 x 7 7 cm hematoma within subcutaneous soft tissue of the right flank with areas of active contrast extravasation on CT A/P   He received DDAVP for " "reversal of Aspirin and was placed in abdominal binder  He complains of continued abdominal pain at the site of the injury  He did not hit his head, has no pain or injuries elsewhere  \" - Per Dr Sugey Rivera H&P on 4/24/2023    Procedures Performed: No orders of the defined types were placed in this encounter  Subjective: \"My side hurts\"  This is a 71-year-old male who describes tripping and falling  Patient sustained a right flank hematoma  My evaluation he complains of some side pain but overall describes being at his functional baseline  He does note currently being worked up by his primary care provider due to intermittent weakness in his lower extremities  He states he underwent an EMG and has a MRI scheduled  He denies any current lower extremity weakness  He denies any headache, dizziness, lightheadedness, palpitations, chest pain  He denies any other pain or discomfort besides his right flank  Objective:  General: No acute distress  Neuro: GCS 15  Light touch sensation intact throughout  5/5 strength in all extremities  No lateralizing weakness  HEENT: Normocephalic, atraumatic  Neck supple  Cardiac: Rate and rhythm   +2 radial and dorsalis pedis pulses, bilaterally  Lungs: Clear to auscultation, bilaterally  Chest wall is nontender on palpation  No orthopnea  No tachypnea  Patient is saturating 98% on room air  Abdomen: Overall soft with the exception of the right flank that has an ecchymotic area that is largely soft, with the exception of the center being more firm--no obvious rapidly expanding hematoma  Patient is wearing abdominal binder prior to exam   Pelvis: Stable  Voiding  MSK  Patient is able to fully range all extremities  Extremities are nontender  No obvious effusions  Skin: Warm, dry  Summary of Hospital Course: This is a 71-year-old male who presented for evaluation after suffering a mechanical fall where he suffered a flank hematoma    Patient does take aspirin " daily-he was reversed with DDAVP  Patient was transferred to [facility] serial hemoglobins were obtained, which have now displayed stability  Patient's vital signs have been stable  Patient's pain has been controlled with multimodal pain regimen  Prior to discharge she was evaluated by PT/OT and recommended for outpatient therapy  Patient will follow-up with trauma in 2 weeks  Significant Findings, Care, Treatment and Services Provided:   No results found  Complications: none    Condition at Discharge: good         Discharge instructions/Information to patient and family:   See after visit summary for information provided to patient and family  Provisions for Follow-Up Care:  See after visit summary for information related to follow-up care and any pertinent home health orders  PCP: Patricia Ratliff MD    Disposition: Home    Planned Readmission: No    Discharge Statement   I spent 27 minutes discharging the patient  This time was spent on the day of discharge  I had direct contact with the patient on the day of discharge  Additional documentation is required if more than 30 minutes were spent on discharge  Discharge Medications:  See after visit summary for reconciled discharge medications provided to patient and family

## 2023-04-25 NOTE — UTILIZATION REVIEW
Initial Clinical Review    4/24 Transfer from [facility] ED  Pt at [facility] from 4/23 to 4/24    Admission: Date/Time/Statement:   Admission Orders (From admission, onward)     Ordered        04/24/23 0556  Inpatient Admission  Once                      Orders Placed This Encounter   Procedures   • Inpatient Admission     Standing Status:   Standing     Number of Occurrences:   1     Order Specific Question:   Level of Care     Answer:   Med Surg [16]     Order Specific Question:   Estimated length of stay     Answer:   More than 2 Midnights     Order Specific Question:   Certification     Answer:   I certify that inpatient services are medically necessary for this patient for a duration of greater than two midnights  See H&P and MD Progress Notes for additional information about the patient's course of treatment  ED Arrival Information     Expected   4/23/2023     Arrival   4/24/2023 05:45    Acuity   Urgent            Means of arrival   Ambulance    Escorted by   [facility]    Service   Trauma    Admission type   Emergency            Arrival complaint   flank hematoma           Chief Complaint   Patient presents with   • Trauma     See narrator       Initial Presentation: 59 y o  male, transfer from [facility] ED presented for hematoma with active extravasation seen on imaging  Pt was assembling a TV stand at home yesterday afternoon  He tripped and fell forward on the partially assembled stand  At [facility] ED, found to have 10 3 x 5 4 x 7 7 cm hematoma within subcutaneous soft tissue of the right flank with areas of active contrast extravasation on CT A/P  Given DDAVP for reversal of Aspirin and was placed in abdominal binder  Pt c/o continued abdominal pain at the site of injury  No head strike  Admit Inpatient level of care for Fall with Hematoma  Abdominal binder placed  Monitor q4hr CBCs     CT AP with contrast: 10 3 x 5 4 x 7 7 cm hematoma within subcutaneous soft tissue of the right flank with areas of active contrast extravasation  4/24  Pt currently being worked up for lower extremity weakness which is not present on examination with PCP  Continue outpt workup  Hgb stable  Continue to wear abdominal binder and hold aspirin for 72 hrs  F/u with Trauma in 2 wks  Incidental finding; One or more sharply circumscribed subcentimeter renal hypodensities are present, too small to accurately characterize, and statistically most likely benign findings  F/u to be done within 1 wk      ED Triage Vitals   Temperature Pulse Respirations Blood Pressure SpO2   04/24/23 0740 04/24/23 0545 04/24/23 0545 04/24/23 0545 04/24/23 0545   97 7 °F (36 5 °C) 66 16 144/73 96 %      Temp src Heart Rate Source Patient Position - Orthostatic VS BP Location FiO2 (%)   -- 04/24/23 0545 04/24/23 0545 04/24/23 0553 --    Monitor Lying Right arm       Pain Score       04/24/23 0545       9          Wt Readings from Last 1 Encounters:   04/23/23 97 4 kg (214 lb 11 7 oz)     Additional Vital Signs:   04/24/23 15:14:39 97 9 °F (36 6 °C) 66 -- 129/71 90 96 % --     Pertinent Labs/Diagnostic Test Results:   No orders to display         Results from last 7 days   Lab Units 04/24/23  1044 04/24/23  0555 04/24/23  0050 04/23/23  2110 04/23/23  1827   WBC Thousand/uL 7 65 8 29  --  13 85* 11 17*   HEMOGLOBIN g/dL 10 8* 11 0* 11 8* 12 5 12 8   HEMATOCRIT % 33 9* 33 9* 35 6* 37 7 38 4   PLATELETS Thousands/uL 204 201  --  213 223   NEUTROS ABS Thousands/µL 5 29 5 95  --  11 72* 8 90*         Results from last 7 days   Lab Units 04/24/23  0555 04/23/23  1827   SODIUM mmol/L 140 139   POTASSIUM mmol/L 3 3* 3 4*   CHLORIDE mmol/L 107 104   CO2 mmol/L 26 28   ANION GAP mmol/L 7 7   BUN mg/dL 14 12   CREATININE mg/dL 0 96 0 94   EGFR ml/min/1 73sq m 84 86   CALCIUM mg/dL 9 3 9 6   MAGNESIUM mg/dL 2 0  --      Results from last 7 days   Lab Units 04/24/23  0555   AST U/L 16   ALT U/L 31   ALK PHOS U/L 144*   TOTAL PROTEIN g/dL 7 2   ALBUMIN g/dL 3 5 TOTAL BILIRUBIN mg/dL 1 81*         Results from last 7 days   Lab Units 04/24/23  0555 04/23/23  1827   GLUCOSE RANDOM mg/dL 115 127       Results from last 7 days   Lab Units 04/24/23  0555   PROTIME seconds 14 9*   INR  1 14     ED Treatment:   Medication Administration from 04/23/2023 2111 to 04/24/2023 0715       Date/Time Order Dose Route Action     04/24/2023 0613 EDT oxyCODONE (ROXICODONE) IR tablet 5 mg 5 mg Oral Given        Past Medical History:   Diagnosis Date   • COPD (chronic obstructive pulmonary disease) ([facility] [address] )    • Diabetes mellitus ([facility] [address] )    • Hypertension    • Pulmonary emboli ([email or web address] )      Present on Admission:  **None**      Admitting Diagnosis: Fall, initial encounter [W19  XXXA]  Unspecified multiple injuries, initial encounter [T07  XXXA]  Age/Sex: 59 y o  male     Admission Orders:  Scheduled Medications:  atorvastatin (LIPITOR) tablet 20 mg  Dose: 20 mg  Freq: Daily with dinner Route: PO  Start: 04/24/23 1630 End: 04/24/23 2039    enoxaparin (LOVENOX) subcutaneous injection 30 mg  Dose: 30 mg  Freq: Every 12 hours scheduled Route: SC  Start: 04/24/23 1245 End: 04/24/23 2039    FLUoxetine (PROzac) capsule 40 mg  Dose: 40 mg  Freq: Daily Route: PO  Start: 04/24/23 0900 End: 04/24/23 2039    labetalol (NORMODYNE) tablet 100 mg  Dose: 100 mg  Freq: Every 12 hours scheduled Route: PO  Start: 04/24/23 0900 End: 04/24/23 2039     losartan (COZAAR) tablet 50 mg  Dose: 50 mg  Freq: Daily Route: PO  Start: 04/24/23 0900 End: 04/24/23 2039        Continuous IV Infusions: None    PRN Meds:  oxyCODONE (ROXICODONE) IR tablet 5 mg  Dose: 5 mg  Freq: Every 4 hours PRN Route: PO 4/24 x1  PRN Reason: severe pain  Start: 04/24/23 0557 End: 04/24/23 2039      IP CONSULT TO GERONTOLOGY    Network Utilization Review Department  ATTENTION: Please call with any questions or concerns to [phone] and carefully listen to the prompts so that you are directed to the right person   All voicemails are confidential   Lauro Ortega all requests for admission clinical reviews, approved or denied determinations and any other requests to dedicated fax number below belonging to the campus where the patient is receiving treatment   List of dedicated fax numbers for the Facilities:  [address] [place] [address] DENIALS (Administrative/Medical Necessity) [phone]   [address] (Maternity/NICU/Pediatrics) [phone]   [address] [phone]   [facility] [address] [phone]   [facility] [address] [phone]   [address] [facility] [address] [facility] [phone]

## 2023-05-11 ENCOUNTER — OFFICE VISIT (OUTPATIENT)
Dept: SURGERY | Facility: CLINIC | Age: 63
End: 2023-05-11

## 2023-05-11 VITALS
HEART RATE: 63 BPM | DIASTOLIC BLOOD PRESSURE: 76 MMHG | BODY MASS INDEX: 35.62 KG/M2 | HEIGHT: 65 IN | RESPIRATION RATE: 12 BRPM | SYSTOLIC BLOOD PRESSURE: 139 MMHG | OXYGEN SATURATION: 97 % | WEIGHT: 213.8 LBS | TEMPERATURE: 98 F
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
## 2023-05-11 DIAGNOSIS — T14.8XXA HEMATOMA: Primary | ICD-10-CM

## 2023-05-11 NOTE — PROGRESS NOTES
Office Visit - General Surgery  Lyubov Morin MRN: [id number]  Encounter: [id number]    Assessment and Plan  Problem List Items Addressed This Visit        Other    Fall     · Continue PCP work-up for ambulatory dysfunction  · Patient referred to neurology         Hematoma - Primary     · Secondary to fall  · Original CT showed: 10 3 x 5 4 x 7 7 cm hematoma within subcutaneous soft tissue of the right flank with areas of active contrast extravasation  · Patient's hemoglobin was monitored IP--patient was discharge after it displayed stability  · Patient restarted his aspirin  Aspirin was held by PCP until trauma follow-up  · Will obtain hemoglobin, if stable patient may resume aspirin from a trauma perspective  · Analgesia as needed  · Discussed prognosis of hematoma, and the likelihood that he will have the hematoma before several months until it is fully reabsorbed  We also reviewed the role that  light activity, stretching, and heat in the reabsorption of the hematoma  We discussed return precautions  · If hemoglobin is stable, patient may follow-up with trauma as needed  Relevant Orders    Hemoglobin       Chief Complaint:  Lyubov Morin is a 59 y o  male who presents for Follow-up (S/u flank hematoma )    Subjective  This is a 76-year-old male who presents to the trauma clinic for reevaluation of right flank hematoma  This was originally suffered secondary to a fall  Patient is currently being worked up by his PCP for ambulatory dysfunction, and has been referred to neurology  Patient has been evaluated since being discharged from the  trauma service and has followed up with his PCP, despite patient being restarted on his aspirin and hemoglobin remaining stable, his aspirin was held by his PCP until he followed up with us today  On my examination patient continues to complain of pain associated with the hematoma  Wife and patient both note the hematoma decreasing in size    He continues to note a solid hematoma on his right flank, but it has significantly receded from where it was previously outlined  He denies any other associated symptoms besides some pain and discomfort with movement and palpation  He no longer is wearing abdominal binder  He confirms that he has been able to complete his ADLs  Past Medical History:   Diagnosis Date   • COPD (chronic obstructive pulmonary disease) ([facility] [address] )    • [facility]    • Hypertension    • Pulmonary emboli ([facility])        Past Surgical History:   Procedure Laterality Date   • APPENDECTOMY     • EYE SURGERY     • LAMINECTOMY         Family History   Problem Relation Age of Onset   • Cancer Father        Social History     Tobacco Use   • Smoking status: Never   • Smokeless tobacco: Never   Substance Use Topics   • Alcohol use: Never   • Drug use: Never        Medications  Current Outpatient Medications on File Prior to Visit   Medication Sig Dispense Refill   • acetaminophen (TYLENOL) 325 mg tablet Take 2 tablets (650 mg total) by mouth every 4 (four) hours as needed for mild pain  0   • aspirin 81 mg chewable tablet Chew 1 tablet (81 mg total) daily Do not start before April 28, 2023   0   • atorvastatin (LIPITOR) 20 mg tablet Take 20 mg by mouth daily     • doxazosin (CARDURA) 2 mg tablet Take 4 mg by mouth daily at bedtime     • FLUoxetine (PROzac) 40 MG capsule Take 40 mg by mouth daily     • labetalol (NORMODYNE) 100 mg tablet Take 50 mg by mouth 2 (two) times a day     • losartan (COZAAR) 50 mg tablet Take 50 mg by mouth daily     • omeprazole (PriLOSEC) 20 mg delayed release capsule Take 20 mg by mouth daily     • oxyCODONE (ROXICODONE) 5 immediate release tablet You may take 2 5 mg (0 5 tab) for moderate pain or 5 mg (1 tab) for severe pain, every 4 hours, as needed   20 tablet 0   • pregabalin (LYRICA) 50 mg capsule Take 50 mg by mouth daily     • senna-docusate sodium (SENOKOT S) 8 6-50 mg per tablet Take 1 tablet by mouth daily at bedtime 30 tablet 0   • traZODone (DESYREL) 100 mg tablet Take 100 mg by mouth daily at bedtime       No current facility-administered medications on file prior to visit  Allergies  No Known Allergies    Review of Systems   Constitutional:        Patient reports continuing to have gait instability and working with PT/OT, and being evaluated by neurology  Genitourinary: Positive for flank pain (Right hematoma discomfort)  Objective  Vitals:    05/11/23 1331   BP: 139/76   Pulse: 63   Resp: 12   Temp: 98 °F (36 7 °C)   SpO2: 97%       Physical Exam  Vitals reviewed  HENT:      Head: Normocephalic  Cardiovascular:      Rate and Rhythm: Normal rate and regular rhythm  Pulses: Normal pulses  Heart sounds: Normal heart sounds  Pulmonary:      Effort: Pulmonary effort is normal       Breath sounds: Normal breath sounds  Abdominal:      General: Abdomen is flat  Palpations: Abdomen is soft  Comments: Right flank area has palpable hematoma approximately 10cm x 6 5 cm  No erythema, no open areas  Previously outlined area that hematoma has receded from  Hematoma is firm  Musculoskeletal:      Comments: Moving all extremities  No appreciable weakness  Skin:     General: Skin is warm  Capillary Refill: Capillary refill takes less than 2 seconds  Neurological:      Mental Status: He is alert and oriented to person, place, and time  Psychiatric:         Mood and Affect: Mood normal          Thought Content:  Thought content normal

## 2023-05-12 ENCOUNTER — LAB (OUTPATIENT)
Dept: LAB | Facility: CLINIC | Age: 63
End: 2023-05-12

## 2023-05-12 DIAGNOSIS — T14.8XXA HEMATOMA: ICD-10-CM

## 2023-05-12 LAB — HGB BLD-MCNC: 11.9 G/DL (ref 12–17)

## 2023-05-15 NOTE — QUICK NOTE
Patient status update:  Hemoglobin increased from 10 8-11 9  Patient may resume home aspirin dosing  Patient confirms understanding  He denies having any other questions or concerns and describes doing well overall

## 2023-06-15 ENCOUNTER — TELEPHONE (OUTPATIENT)
Dept: NEUROLOGY | Facility: CLINIC | Age: 63
End: 2023-06-15

## 2023-06-15 NOTE — TELEPHONE ENCOUNTER
Patient is scheduled NP appointment on 10/10/23 with Dr Andi Bridges  Patient stated he cannot do the MRI because his insurance didn't approve it   Please advise

## 2023-09-18 ENCOUNTER — TELEPHONE (OUTPATIENT)
Dept: NEUROLOGY | Facility: CLINIC | Age: 63
End: 2023-09-18

## 2023-09-18 NOTE — TELEPHONE ENCOUNTER
I called to confirm upcoming appointment with patient. Patient stated that he doers not need the appointment anymore. Patient wanted to cancel appointment.

## 2023-12-18 ENCOUNTER — HOSPITAL ENCOUNTER (EMERGENCY)
Facility: HOSPITAL | Age: 63
Discharge: HOME | End: 2023-12-18
Attending: EMERGENCY MEDICINE
Payer: COMMERCIAL

## 2023-12-18 ENCOUNTER — APPOINTMENT (EMERGENCY)
Dept: RADIOLOGY | Facility: HOSPITAL | Age: 63
End: 2023-12-18
Payer: COMMERCIAL

## 2023-12-18 ENCOUNTER — APPOINTMENT (EMERGENCY)
Dept: CT IMAGING | Facility: HOSPITAL | Age: 63
End: 2023-12-18
Payer: COMMERCIAL

## 2023-12-18 VITALS
HEART RATE: 61 BPM | SYSTOLIC BLOOD PRESSURE: 147 MMHG | OXYGEN SATURATION: 93 % | RESPIRATION RATE: 14 BRPM | DIASTOLIC BLOOD PRESSURE: 81 MMHG | TEMPERATURE: 97.9 F

## 2023-12-18 DIAGNOSIS — M54.2 NECK PAIN: ICD-10-CM
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
## 2023-12-18 DIAGNOSIS — I10 HYPERTENSION: ICD-10-CM

## 2023-12-18 DIAGNOSIS — E87.6 HYPOKALEMIA: ICD-10-CM

## 2023-12-18 DIAGNOSIS — R16.1 SPLENOMEGALY: ICD-10-CM

## 2023-12-18 DIAGNOSIS — J90 PLEURAL EFFUSION, BILATERAL: ICD-10-CM

## 2023-12-18 LAB
ABO GROUP BLD: NORMAL
ANION GAP SERPL CALCULATED.3IONS-SCNC: 8 MMOL/L
BASOPHILS # BLD AUTO: 0.04 THOUSANDS/ÂΜL (ref 0–0.1)
BASOPHILS NFR BLD AUTO: 0 % (ref 0–1)
BLD GP AB SCN SERPL QL: NEGATIVE
BUN SERPL-MCNC: 10 MG/DL (ref 5–25)
CALCIUM SERPL-MCNC: 9.2 MG/DL (ref 8.4–10.2)
CARDIAC TROPONIN I PNL SERPL HS: 5 NG/L
CHLORIDE SERPL-SCNC: 104 MMOL/L (ref 96–108)
CO2 SERPL-SCNC: 29 MMOL/L (ref 21–32)
CREAT SERPL-MCNC: 0.94 MG/DL (ref 0.6–1.3)
EOSINOPHIL # BLD AUTO: 0.16 THOUSAND/ÂΜL (ref 0–0.61)
EOSINOPHIL NFR BLD AUTO: 2 % (ref 0–6)
ERYTHROCYTE [DISTWIDTH] IN BLOOD BY AUTOMATED COUNT: 14.5 % (ref 11.6–15.1)
GFR SERPL CREATININE-BSD FRML MDRD: 86 ML/MIN/1.73SQ M
GLUCOSE SERPL-MCNC: 105 MG/DL (ref 65–140)
HCT VFR BLD AUTO: 40.1 % (ref 36.5–49.3)
HGB BLD-MCNC: 13.2 G/DL (ref 12–17)
IMM GRANULOCYTES # BLD AUTO: 0.08 THOUSAND/UL (ref 0–0.2)
IMM GRANULOCYTES NFR BLD AUTO: 1 % (ref 0–2)
LYMPHOCYTES # BLD AUTO: 1.83 THOUSANDS/ÂΜL (ref 0.6–4.47)
LYMPHOCYTES NFR BLD AUTO: 17 % (ref 14–44)
MCH RBC QN AUTO: 29.1 PG (ref 26.8–34.3)
MCHC RBC AUTO-ENTMCNC: 32.9 G/DL (ref 31.4–37.4)
MCV RBC AUTO: 88 FL (ref 82–98)
MONOCYTES # BLD AUTO: 0.63 THOUSAND/ÂΜL (ref 0.17–1.22)
MONOCYTES NFR BLD AUTO: 6 % (ref 4–12)
NEUTROPHILS # BLD AUTO: 8.11 THOUSANDS/ÂΜL (ref 1.85–7.62)
NEUTS SEG NFR BLD AUTO: 74 % (ref 43–75)
NRBC BLD AUTO-RTO: 0 /100 WBCS
PLATELET # BLD AUTO: 203 THOUSANDS/UL (ref 149–390)
PMV BLD AUTO: 9.3 FL (ref 8.9–12.7)
POTASSIUM SERPL-SCNC: 3.2 MMOL/L (ref 3.5–5.3)
RBC # BLD AUTO: 4.54 MILLION/UL (ref 3.88–5.62)
RH BLD: NEGATIVE
SODIUM SERPL-SCNC: 141 MMOL/L (ref 135–147)
SPECIMEN EXPIRATION DATE: NORMAL
WBC # BLD AUTO: 10.85 THOUSAND/UL (ref 4.31–10.16)

## 2023-12-18 PROCEDURE — 36415 COLL VENOUS BLD VENIPUNCTURE: CPT | Performed by: PHYSICIAN ASSISTANT

## 2023-12-18 PROCEDURE — 74177 CT ABD & PELVIS W/CONTRAST: CPT

## 2023-12-18 PROCEDURE — 72125 CT NECK SPINE W/O DYE: CPT

## 2023-12-18 PROCEDURE — 93005 ELECTROCARDIOGRAM TRACING: CPT

## 2023-12-18 PROCEDURE — 99285 EMERGENCY DEPT VISIT HI MDM: CPT | Performed by: PHYSICIAN ASSISTANT

## 2023-12-18 PROCEDURE — 96375 TX/PRO/DX INJ NEW DRUG ADDON: CPT

## 2023-12-18 PROCEDURE — 84484 ASSAY OF TROPONIN QUANT: CPT | Performed by: PHYSICIAN ASSISTANT

## 2023-12-18 PROCEDURE — 86901 BLOOD TYPING SEROLOGIC RH(D): CPT | Performed by: PHYSICIAN ASSISTANT

## 2023-12-18 PROCEDURE — 80048 BASIC METABOLIC PNL TOTAL CA: CPT | Performed by: PHYSICIAN ASSISTANT

## 2023-12-18 PROCEDURE — 86850 RBC ANTIBODY SCREEN: CPT | Performed by: PHYSICIAN ASSISTANT

## 2023-12-18 PROCEDURE — 70450 CT HEAD/BRAIN W/O DYE: CPT

## 2023-12-18 PROCEDURE — 96374 THER/PROPH/DIAG INJ IV PUSH: CPT

## 2023-12-18 PROCEDURE — 71045 X-RAY EXAM CHEST 1 VIEW: CPT

## 2023-12-18 PROCEDURE — 99285 EMERGENCY DEPT VISIT HI MDM: CPT

## 2023-12-18 PROCEDURE — 86900 BLOOD TYPING SEROLOGIC ABO: CPT | Performed by: PHYSICIAN ASSISTANT

## 2023-12-18 PROCEDURE — 85025 COMPLETE CBC W/AUTO DIFF WBC: CPT | Performed by: PHYSICIAN ASSISTANT

## 2023-12-18 PROCEDURE — 71260 CT THORAX DX C+: CPT

## 2023-12-18 RX ORDER — POTASSIUM CHLORIDE 20 MEQ/1
40 TABLET, EXTENDED RELEASE ORAL ONCE
Status: COMPLETED | OUTPATIENT
Start: 2023-12-18 | End: 2023-12-18

## 2023-12-18 RX ORDER — FENTANYL CITRATE 50 UG/ML
50 INJECTION, SOLUTION INTRAMUSCULAR; INTRAVENOUS ONCE
Status: COMPLETED | OUTPATIENT
Start: 2023-12-18 | End: 2023-12-18

## 2023-12-18 RX ORDER — KETOROLAC TROMETHAMINE 30 MG/ML
15 INJECTION, SOLUTION INTRAMUSCULAR; INTRAVENOUS ONCE
Status: COMPLETED | OUTPATIENT
Start: 2023-12-18 | End: 2023-12-18

## 2023-12-18 RX ADMIN — IOHEXOL 100 ML: 350 INJECTION, SOLUTION INTRAVENOUS at 17:41

## 2023-12-18 RX ADMIN — KETOROLAC TROMETHAMINE 15 MG: 30 INJECTION, SOLUTION INTRAMUSCULAR at 19:01

## 2023-12-18 RX ADMIN — FENTANYL CITRATE 50 MCG: 50 INJECTION, SOLUTION INTRAMUSCULAR; INTRAVENOUS at 18:01

## 2023-12-18 RX ADMIN — POTASSIUM CHLORIDE 40 MEQ: 1500 TABLET, EXTENDED RELEASE ORAL at 19:00

## 2023-12-18 NOTE — DISCHARGE INSTRUCTIONS
You should continue home medications as prescribed.  Continue to monitor your blood pressure.  Follow up with PCP for recheck or return to ER as needed.

## 2023-12-18 NOTE — ED PROVIDER NOTES
Emergency Department Trauma Note  Sridhar Lion 63 y.o. male MRN: [id number]  Unit/Bed#: RM13/RM13 Encounter: [id number]      Trauma Alert: Trauma Acuity: Trauma Evaluation trauma evaluation  Model of Arrival: Mode of Arrival: Other (Comment) (POV) via   private vehicle  Trauma Team: Current Providers  Attending Provider: Bety Ortiz MD  Registered Nurse: Meg Shahid RN  Physician Assistant: Yasmin Agustin PA-C  Consultants:     None      History of Present Illness     Chief Complaint:   Chief Complaint   Patient presents with    Fall     Slipped and fell approx 3 ft off of porch, fell to ground hitting head. No LOC, takes aspirin daily complains of neck pain       HPI:  Sridhar Lion is a 63 y.o. male who presents for evaluation after a fall.  Mechanism:Details of Incident: Patient was fixing lights when he slipped, falling, and hitting his head. Injury Date: 12/18/23 Injury Time: 1300 Injury Occurence Location - Specify County: [place]    63 year old male with PMH COPD, DM, HTN, h/o PE presenting ambulatory from home accompanied by significant other for evaluation after a fall.  Pt reports this occurred today around noon.  He states he was outside fixing the [employer] lights standing on their wooden porch.  He reports while reaching above his head he fell off that landing.  He states there is no banister.  He reports falling forward.  He reports hitting the top of his head near his forehead and landing on his stomach/chest.  He is unsure if there was LOC.  He states it happened so fast.  He reports pain along left side of neck.  He reports having a headache.  He also reports pain in his chest as well abdomen.  Denies SOB.  Denies N/V.  Denies flank or back pain.  Denies any prodromal symptoms prior to falling.  Denies dizziness, lightheadedness.  Denies numbness, tingling, weakness.  No pain reported in arms or legs.  He has been ambulatory.  Denies any anticoagulation.  He notes baby aspirin  daily.  He expresses concern about his neck as he had a cervical fusion a few months ago.      History provided by:  Patient and medical records   used: No    Fall  Mechanism of injury: fall    Prior to arrival data:     Blood loss:  None  Associated symptoms: abdominal pain, chest pain and neck pain    Associated symptoms: no back pain, no difficulty breathing, no headaches, no nausea and no vomiting    Risk factors: no anticoagulation therapy      Review of Systems   Constitutional: Negative.  Negative for chills, fatigue and fever.   HENT: Negative.  Negative for congestion, rhinorrhea and sore throat.    Eyes: Negative.  Negative for visual disturbance.   Respiratory: Negative.  Negative for cough, shortness of breath and wheezing.    Cardiovascular:  Positive for chest pain. Negative for palpitations and leg swelling.   Gastrointestinal:  Positive for abdominal pain. Negative for constipation, diarrhea, nausea and vomiting.   Genitourinary: Negative.  Negative for dysuria, flank pain, frequency and hematuria.   Musculoskeletal:  Positive for neck pain. Negative for back pain.   Skin: Negative.  Negative for rash.   Neurological: Negative.  Negative for dizziness, light-headedness, numbness and headaches.   Psychiatric/Behavioral: Negative.  Negative for confusion.    All other systems reviewed and are negative.      Historical Information     Immunizations:   There is no immunization history on file for this patient.    Past Medical History:   Diagnosis Date    COPD (chronic obstructive pulmonary disease) (HCC)     Diabetes mellitus (HCC)     Hypertension     Pulmonary emboli (HCC)        Family History   Problem Relation Age of Onset    Cancer Father      Past Surgical History:   Procedure Laterality Date    APPENDECTOMY      EYE SURGERY      LAMINECTOMY       Social History     Tobacco Use    Smoking status: Never    Smokeless tobacco: Never   Substance Use Topics    Alcohol use: Never     Drug use: Never     E-Cigarette/Vaping     E-Cigarette/Vaping Substances       Family History: non-contributory    Meds/Allergies   Prior to Admission Medications   Prescriptions Last Dose Informant Patient Reported? Taking?   FLUoxetine (PROzac) 40 MG capsule   Yes No   Sig: Take 40 mg by mouth daily   acetaminophen (TYLENOL) 325 mg tablet   No No   Sig: Take 2 tablets (650 mg total) by mouth every 4 (four) hours as needed for mild pain   aspirin 81 mg chewable tablet   No No   Sig: Chew 1 tablet (81 mg total) daily Do not start before April 28, 2023.   atorvastatin (LIPITOR) 20 mg tablet   Yes No   Sig: Take 20 mg by mouth daily   doxazosin (CARDURA) 2 mg tablet   Yes No   Sig: Take 4 mg by mouth daily at bedtime   labetalol (NORMODYNE) 100 mg tablet   Yes No   Sig: Take 50 mg by mouth 2 (two) times a day   losartan (COZAAR) 50 mg tablet   Yes No   Sig: Take 50 mg by mouth daily   omeprazole (PriLOSEC) 20 mg delayed release capsule   Yes No   Sig: Take 20 mg by mouth daily   oxyCODONE (ROXICODONE) 5 immediate release tablet   No No   Sig: You may take 2.5 mg (0.5 tab) for moderate pain or 5 mg (1 tab) for severe pain, every 4 hours, as needed.   pregabalin (LYRICA) 50 mg capsule   Yes No   Sig: Take 50 mg by mouth daily   senna-docusate sodium (SENOKOT S) 8.6-50 mg per tablet   No No   Sig: Take 1 tablet by mouth daily at bedtime   traZODone (DESYREL) 100 mg tablet   Yes No   Sig: Take 100 mg by mouth daily at bedtime      Facility-Administered Medications: None       No Known Allergies    PHYSICAL EXAM    Objective   Vitals:   First set: Temperature: 97.9 °F (36.6 °C) (12/18/23 1644)  Pulse: 62 (12/18/23 1644)  Respirations: 18 (12/18/23 1644)  Blood Pressure: (!) 180/94 (12/18/23 1644)  SpO2: 94 % (12/18/23 1644)    Primary Survey:   (A) Airway: airway patent.  Pt phonating normally.  (B) Breathing: lung sounds present bilaterally  (C) Circulation: Pulses:   normal, carotid  2/4, pedal  2/4, radial  2/4, and  femoral  2/4; there is no visible external hemorrhage.  (D) Disabliity:  GCS Total:  15, Eye Opening:   Spontaneous = 4, Motor Response: Obeys commands = 6, and Verbal Response:  Oriented = 5  (E) Expose:  Completed    Secondary survey was completed for all other injuries.    Secondary Survey: (Click on Physical Exam tab above)  Physical Exam  Vitals and nursing note reviewed.   Constitutional:       General: He is awake. He is not in acute distress.     Appearance: Normal appearance. He is well-developed. He is obese. He is not toxic-appearing or diaphoretic.   HENT:      Head: Normocephalic and atraumatic. No raccoon eyes, Mcrae's sign, abrasion, contusion or laceration.      Jaw: There is normal jaw occlusion.      Right Ear: Hearing, tympanic membrane, ear canal and external ear normal. No hemotympanum.      Left Ear: Hearing, tympanic membrane, ear canal and external ear normal. No hemotympanum.      Nose: Nose normal. No signs of injury.      Mouth/Throat:      Mouth: Mucous membranes are moist.      Dentition: Has dentures.      Pharynx: Oropharynx is clear. Uvula midline.   Eyes:      General: Lids are normal. No scleral icterus.     Extraocular Movements: Extraocular movements intact.      Conjunctiva/sclera: Conjunctivae normal.      Pupils: Pupils are equal, round, and reactive to light.   Neck:      Trachea: Trachea and phonation normal.     Cardiovascular:      Rate and Rhythm: Normal rate and regular rhythm.      Pulses: Normal pulses.           Radial pulses are 2+ on the right side and 2+ on the left side.        Dorsalis pedis pulses are 2+ on the right side and 2+ on the left side.        Posterior tibial pulses are 2+ on the right side and 2+ on the left side.      Heart sounds: Normal heart sounds, S1 normal and S2 normal. No murmur heard.  Pulmonary:      Effort: Pulmonary effort is normal. No tachypnea or respiratory distress.      Breath sounds: Normal breath sounds. No wheezing, rhonchi or  rales.   Chest:      Chest wall: Tenderness present. No deformity, swelling or crepitus.       Abdominal:      General: Bowel sounds are normal. There is no distension.      Palpations: Abdomen is soft.      Tenderness: There is no abdominal tenderness. There is no right CVA tenderness, left CVA tenderness, guarding or rebound.   Musculoskeletal:      Cervical back: Normal range of motion and neck supple. Muscular tenderness present. No spinous process tenderness.      Right lower leg: No edema.      Left lower leg: No edema.   Skin:     General: Skin is warm and dry.      Capillary Refill: Capillary refill takes less than 2 seconds.      Findings: No abrasion, bruising, laceration, rash or wound.   Neurological:      General: No focal deficit present.      Mental Status: He is alert and oriented to person, place, and time.      GCS: GCS eye subscore is 4. GCS verbal subscore is 5. GCS motor subscore is 6.      Cranial Nerves: Cranial nerves 2-12 are intact.      Sensory: Sensation is intact.      Motor: Motor function is intact.      Gait: Gait normal.      Comments: Pt ambulatory to exam room.   Psychiatric:         Mood and Affect: Mood normal.         Speech: Speech normal.         Behavior: Behavior normal. Behavior is cooperative.         Cervical spine cleared by clinical criteria? No (imaging required)      Invasive Devices       None                   Lab Results:   Results Reviewed       Procedure Component Value Units Date/Time    HS Troponin 0hr (reflex protocol) [[id number]]  (Normal) Collected: 12/18/23 1714    Lab Status: Final result Specimen: Blood from Arm, Left Updated: 12/18/23 1739     hs TnI 0hr 5 ng/L     Basic metabolic panel [[id number]]  (Abnormal) Collected: 12/18/23 1714    Lab Status: Final result Specimen: Blood from Arm, Left Updated: 12/18/23 1732     Sodium 141 mmol/L      Potassium 3.2 mmol/L      Chloride 104 mmol/L      CO2 29 mmol/L      ANION GAP 8 mmol/L      BUN 10 mg/dL       Creatinine 0.94 mg/dL      Glucose 105 mg/dL      Calcium 9.2 mg/dL      eGFR 86 ml/min/1.73sq m     Narrative:      National Kidney Disease Foundation guidelines for Chronic Kidney Disease (CKD):     Stage 1 with normal or high GFR (GFR > 90 mL/min/1.73 square meters)    Stage 2 Mild CKD (GFR = 60-89 mL/min/1.73 square meters)    Stage 3A Moderate CKD (GFR = 45-59 mL/min/1.73 square meters)    Stage 3B Moderate CKD (GFR = 30-44 mL/min/1.73 square meters)    Stage 4 Severe CKD (GFR = 15-29 mL/min/1.73 square meters)    Stage 5 End Stage CKD (GFR <15 mL/min/1.73 square meters)  Note: GFR calculation is accurate only with a steady state creatinine    CBC and differential [[id number]]  (Abnormal) Collected: 12/18/23 1714    Lab Status: Final result Specimen: Blood from Arm, Left Updated: 12/18/23 1719     WBC 10.85 Thousand/uL      RBC 4.54 Million/uL      Hemoglobin 13.2 g/dL      Hematocrit 40.1 %      MCV 88 fL      MCH 29.1 pg      MCHC 32.9 g/dL      RDW 14.5 %      MPV 9.3 fL      Platelets 203 Thousands/uL      nRBC 0 /100 WBCs      Neutrophils Relative 74 %      Immat GRANS % 1 %      Lymphocytes Relative 17 %      Monocytes Relative 6 %      Eosinophils Relative 2 %      Basophils Relative 0 %      Neutrophils Absolute 8.11 Thousands/µL      Immature Grans Absolute 0.08 Thousand/uL      Lymphocytes Absolute 1.83 Thousands/µL      Monocytes Absolute 0.63 Thousand/µL      Eosinophils Absolute 0.16 Thousand/µL      Basophils Absolute 0.04 Thousands/µL                    Imaging Studies:   Direct to CT: Yes  TRAUMA - CT head wo contrast   Final Result by Peng Crandall MD (12/18 1754)      No intracranial hemorrhage or calvarial fracture.      The study was marked in EPIC for immediate notification per trauma protocol.            Workstation performed: [id number]         TRAUMA - CT spine cervical wo contrast   Final Result by Peng Crandall MD (12/18 1757)      1.  No cervical spine fracture or  traumatic malalignment.   2.  ACDF C4-C7 with intact hardware.            Workstation performed: [id number]         TRAUMA - CT chest abdomen pelvis w contrast   Final Result by Peng Crandall MD (12/18 1807)      1.  No acute traumatic findings in the chest, abdomen, or pelvis.   2.  Trace bilateral pleural effusions with bibasilar atelectasis.   3.  Splenomegaly.      The study was marked in EPIC for immediate notification per trauma protocol.      Workstation performed: [id number]         XR Trauma chest portable   Final Result by Lobo Garcia MD (12/18 1723)      No portable radiographic evidence of acute thoracic injury.                  Workstation performed: [id number]               Procedures  ECG 12 Lead Documentation Only    Date/Time: 12/18/2023 5:17 PM    Performed by: Yasmin Agustin PA-C  Authorized by: Yasmin Agustin PA-C    Indications / Diagnosis:  Chest pain  ECG reviewed by me, the ED Provider: yes    Patient location:  ED  Previous ECG:     Comparison to cardiac monitor: Yes    Interpretation:     Interpretation: abnormal    Rate:     ECG rate:  65    ECG rate assessment: normal    Rhythm:     Rhythm: sinus rhythm    Conduction:     Conduction: abnormal      Abnormal conduction: complete RBBB    ST segments:     ST segments:  Non-specific  T waves:     T waves: normal    Comments:      , , QT/QTc 518/538           ED Course  ED Course as of 12/18/23 2120   Mon Dec 18, 2023   1722 XR Trauma chest portable  Independently viewed and interpreted by me - no acute cardiopulmonary process; pending official read.   1722 WBC(!): 10.85  Minimally elevated, non specific   1722 Hemoglobin: 13.2  Previously noted anemia has improved   1722 Platelet Count: 203   1733 Glucose, Random: 105   1733 Creatinine: 0.94   1733 BUN: 10   1733 Sodium: 141   1733 Potassium(!): 3.2  Mildly decreased, will replete   1733 Chloride: 104   1733 CO2: 29   1733 Anion Gap: 8   1733 Calcium: 9.2  "  1733 eGFR: 86   1742 hs TnI 0hr: 5  Not c/w ACS   1804 TRAUMA - CT head wo contrast  IMPRESSION:     No intracranial hemorrhage or calvarial fracture.   1804 TRAUMA - CT spine cervical wo contrast     IMPRESSION:     1.  No cervical spine fracture or traumatic malalignment.  2.  ACDF C4-C7 with intact hardware.   1814 TRAUMA - CT chest abdomen pelvis w contrast  IMPRESSION:     1.  No acute traumatic findings in the chest, abdomen, or pelvis.  2.  Trace bilateral pleural effusions with bibasilar atelectasis.  3.  Splenomegaly.   1817 Patient re-evaluated after negative CT C-spine. Patient has no midline C-spine tenderness, no apparent intoxication or altered mental status, no focal neuro deficits, and no distracting injuries. Cervical collar removed.  Results reviewed with pt and spouse.  He reports he is feeling improved.  Will replete his hypokalemia with dose of oral potassium.  Spouse notes he took his prescribed 10 mEQ today.  BP also noted to be elevated.  Notes compliance with home medications and they both note he \"always runs high\". Pt is requesting to go home at this time.  Will discharge.           Medical Decision Making  62 yo male presenting for evaluation of pain after a fall.  Pt was made a trauma evaluation up evaluation.  Will obtain EKG, CXR, labs, and CT imaging.    Work up obtained as noted above.  EKG and troponin not c/w ACS.  CXR does not reveal pneumonia, pneumothorax, vascular congestion or pleural effusion.  Minimal non specific leukocytosis, no anemia.  No infectious concerns.  No hypo or hyperglycemia.  Renal function within normal limits.  Mild hypokalemia orally repleted.  CT imaging without acute traumatic intra-cranial, cervical, intra-thoracic or intra-abdominal trauma.  Incidental findings reviewed.  Symptomatically pt felt improved.  Will discharge with symptomatic management and outpatient follow up.    Reviewed symptomatic management.  OTC meds reviewed.  Anticipatory " guidance.  Advised recheck with PCP or return to ER as needed.  Strict return precautions outlined.  Patient voiced understanding and had no further questions.    Please refer to above ER course for further details/discussion.          Problems Addressed:  Fall, initial encounter: acute illness or injury  Hypertension: chronic illness or injury     Details: Advised to continue home medication, monitor and f/u with PCP.  Hypokalemia: acute illness or injury     Details: Orally repleted  Neck pain: acute illness or injury     Details: Acute on chronic; no traumatic findings.  No hardware complication.  Pleural effusion, bilateral: acute illness or injury     Details: Trace bilateral ; pt doesn't appear to have any symptoms related to this.  Recommended recheck with PCP to further evaluate.  Last echo was in 2022.  Splenomegaly: chronic illness or injury     Details: Unclear significance.  Advised outpatient follow up.    Amount and/or Complexity of Data Reviewed  Labs: ordered. Decision-making details documented in ED Course.  Radiology: ordered. Decision-making details documented in ED Course.    Risk  Prescription drug management.                Disposition  [facility] Transfer: No  Final diagnoses:   Fall, initial encounter   Neck pain   Hypertension   Hypokalemia   Pleural effusion, bilateral - trace   Splenomegaly     Time reflects when diagnosis was documented in both MDM as applicable and the Disposition within this note       Time User Action Codes Description Comment    12/18/2023  6:35 PM Yasmin Agustin [W19.XXXA] Fall, initial encounter     12/18/2023  6:35 PM Yasmin Agustin Add [M54.2] Neck pain     12/18/2023  6:35 PM Yasmin Agustin Add [I10] Hypertension     12/18/2023  6:35 PM Yasmin Agustin Add [E87.6] Hypokalemia     12/18/2023  6:36 PM Yasmin Agustin Add [J90] Pleural effusion, bilateral     12/18/2023  6:36 PM OlYasmin lee Modify [J90] Pleural effusion, bilateral trace     12/18/2023  6:36 PM Sunilrosa Yasmin Elkins [R16.1] Splenomegaly           ED Disposition       ED Disposition   Discharge    Condition   Stable    Date/Time   Mon Dec 18, 2023  6:37 PM    Comment   Sridhar Lion discharge to home/self care.                   Follow-up Information       Follow up With Specialties Details Why Contact Info Additional Information    Grzegorz Copeland MD Internal Medicine Schedule an appointment as soon as possible for a visit   [address] [facility] [address]  [phone]       Formerly [facility] Emergency Department Emergency Medicine  As needed [address] [facility] [id number] [phone] Formerly [facility] Emergency Department, [address], [place], [address]          Discharge Medication List as of 12/18/2023  6:44 PM        CONTINUE these medications which have NOT CHANGED    Details   acetaminophen (TYLENOL) 325 mg tablet Take 2 tablets (650 mg total) by mouth every 4 (four) hours as needed for mild pain, Starting Mon 4/24/2023, No Print      aspirin 81 mg chewable tablet Chew 1 tablet (81 mg total) daily Do not start before April 28, 2023., Starting Fri 4/28/2023, No Print      atorvastatin (LIPITOR) 20 mg tablet Take 20 mg by mouth daily, Historical Med      doxazosin (CARDURA) 2 mg tablet Take 4 mg by mouth daily at bedtime, Historical Med      FLUoxetine (PROzac) 40 MG capsule Take 40 mg by mouth daily, Historical Med      labetalol (NORMODYNE) 100 mg tablet Take 50 mg by mouth 2 (two) times a day, Historical Med      losartan (COZAAR) 50 mg tablet Take 50 mg by mouth daily, Historical Med      omeprazole (PriLOSEC) 20 mg delayed release capsule Take 20 mg by mouth daily, Historical Med      oxyCODONE (ROXICODONE) 5 immediate release tablet You may take 2.5 mg (0.5 tab) for moderate pain or 5 mg (1 tab) for severe pain, every 4 hours, as needed., Normal      pregabalin (LYRICA) 50 mg capsule Take 50 mg by mouth daily,  Historical Med      senna-docusate sodium (SENOKOT S) 8.6-50 mg per tablet Take 1 tablet by mouth daily at bedtime, Starting Mon 4/24/2023, Normal      traZODone (DESYREL) 100 mg tablet Take 100 mg by mouth daily at bedtime, Historical Med           No discharge procedures on file.    PDMP Review         Value Time User    PDMP Reviewed  Yes 12/18/2023  5:02 PM Yasmin Agustin PA-C            ED Provider  Electronically Signed by           Yasmin Agustin PA-C  12/18/23 2684

## 2023-12-19 LAB
ATRIAL RATE: 65 BPM
P AXIS: 62 DEGREES
PR INTERVAL: 176 MS
QRS AXIS: -34 DEGREES
QRSD INTERVAL: 150 MS
QT INTERVAL: 518 MS
QTC INTERVAL: 538 MS
T WAVE AXIS: 27 DEGREES
VENTRICULAR RATE: 65 BPM

## 2024-10-17 ENCOUNTER — EVALUATION (OUTPATIENT)
Dept: PHYSICAL THERAPY | Facility: CLINIC | Age: 64
End: 2024-10-17
Payer: COMMERCIAL

## 2024-10-17 DIAGNOSIS — M54.16 LUMBAR RADICULOPATHY: Primary | ICD-10-CM

## 2024-10-17 DIAGNOSIS — R26.9 GAIT ABNORMALITY: ICD-10-CM

## 2024-10-17 PROCEDURE — 97164 PT RE-EVAL EST PLAN CARE: CPT | Performed by: PHYSICAL THERAPIST

## 2024-10-17 PROCEDURE — 97110 THERAPEUTIC EXERCISES: CPT | Performed by: PHYSICAL THERAPIST

## 2024-10-17 NOTE — LETTER
2024    Grzegorz Copeland MD  [address] [facility] [address]    Patient: Sridhar Lion   YOB: 1960   Date of Visit: 10/17/2024     Encounter Diagnosis     ICD-10-CM    1. Lumbar radiculopathy  M54.16       2. Gait abnormality  R26.9           Dear Dr. Copeland:    Thank you for your recent referral of Sridhar Lion. Please review the attached evaluation summary from Sridhar's recent visit.     Please verify that you agree with the plan of care by signing the attached order.     If you have any questions or concerns, please do not hesitate to call.     I sincerely appreciate the opportunity to share in the care of one of your patients and hope to have another opportunity to work with you in the near future.       Sincerely,    Zaheer Marquez, PT      Referring Provider:      I certify that I have read the below Plan of Care and certify the need for these services furnished under this plan of treatment while under my care.                    Grzegorz Copeland MD  [address] [facility] [address]  Via Fax: [phone]          PT Evaluation     Today's date: 10/17/2024  Patient name: Sridhar Lion  : 1960  MRN: [id number]  Referring provider: Grzegorz Copeland MD  Dx:   Encounter Diagnosis     ICD-10-CM    1. Lumbar radiculopathy  M54.16       2. Gait abnormality  R26.9           Start Time: 1100  Stop Time: 1145  Total time in clinic (min): 45 minutes    Assessment  Impairments: abnormal gait, abnormal or restricted ROM, activity intolerance, impaired balance, impaired physical strength, lacks appropriate home exercise program, pain with function and activity limitations  Symptom irritability: moderate    Assessment details: Patient is a 65 y/o male with chief c/o LBP and L LE radiculopathy. Patient presents with mild to moderate limitations to B/L LE strength. There is also moderate limitations noted with active lumbar rom assessment. Time was spent counseling the patient on the  "importance of assistance from an AD such as a cane or walker to help decrease the risk of future falls secondary to LE weakness. He is agreeable to this plan and reports that he utilizes a cane at times but has left it in his car entering therapy today.     Goals  STGs:  \"Patient will be independent with hep by 2-3 visits.   Decrease low back pain by 25% for improved tolerance with adls and home duties by 3-4 weeks.   Improve Lumbar rom to wfl for improved tolerance with adls and home duties by 3-4 weeks. \"    LTGs:  \"Improve FOTO score from 45 to 48 indicating improved tolerance with activities involving the low back by discharge.   Patient will demonstrate rom and strength to the lumbar spine wfl for improved tolerance with adls and home duties by discharge.   Patient will be free of radicular symptoms by discharge. \"      Plan  Patient would benefit from: skilled physical therapy  Planned modality interventions: cryotherapy and thermotherapy: hydrocollator packs    Planned therapy interventions: abdominal trunk stabilization, ADL retraining, body mechanics training, flexibility, functional ROM exercises, home exercise program, therapeutic exercise, therapeutic activities, stretching, strengthening, postural training, patient education, joint mobilization and manual therapy    Frequency: 1-2x week  Duration in weeks: 6  Plan of Care beginning date: 10/17/2024  Plan of Care expiration date: 11/28/2024  Treatment plan discussed with: patient  Plan details: Patient informed that from this point forward, to ensure adherence to the aforementioned plan of care, all or some of the treatment may be performed and carried out by a Physical Therapy Assistant (PTA) with supervision from a licensed Physical Therapist (PT) in accordance with [facility] Physical Therapy Practice Act.  Patient will continue to benefit from skilled physical therapy to address the functional deficits that were identified during the " evaluation today. We will continue to progress the therapy program to address these functional deficits and achieve the established goals.              Subjective Evaluation    History of Present Illness  Mechanism of injury: Patient presents to out patient physical therapy with chief c/o low back pain and gait difficulties. He has a history of lumbar surgery from February of this past year. He notes that he initially noted improvements after the surgery but in August he started to notice a return of L LE symptoms and that his legs were starting to get weak on him and that he had fallen a couple times as his legs gave out on him. He notes that his back will bother him the most when he is walking or getting up from a chair.           Recurrent probem    Quality of life: good    Patient Goals  Patient goals for therapy: decreased pain, increased motion, increased strength and independence with ADLs/IADLs  Patient goal: Patient wishes to improve his balance and have less pain in his low back and L LE.  Pain  Current pain ratin  At best pain ratin  At worst pain rating: 10  Location: Lumbar  Quality: discomfort, dull ache, radiating, throbbing and sharp  Relieving factors: rest and change in position  Aggravating factors: walking, stair climbing and standing (Sit to stand transfers)    Social Support  Lives with: significant other    Employment status: not working      Objective     Concurrent Complaints  Negative for night pain, disturbed sleep, bladder dysfunction, bowel dysfunction and saddle (S4) numbness    Active Range of Motion     Lumbar   Flexion:  with pain Restriction level: moderate  Extension:  with pain Restriction level: moderate  Left lateral flexion:  with pain Restriction level: moderate  Right lateral flexion:  with pain Restriction level: moderate  Left rotation:  with pain Restriction level: moderate  Right rotation:  with pain Restriction level: moderate    Strength/Myotome Testing     Left  "Hip   Planes of Motion   Flexion: 4  Extension: 4  Abduction: 4-  Adduction: 4-    Right Hip   Planes of Motion   Flexion: 4  Extension: 4  Abduction: 4-  Adduction: 4-    Left Knee   Flexion: 4  Extension: 4    Right Knee   Flexion: 4  Extension: 4    Left Ankle/Foot   Dorsiflexion: 4  Plantar flexion: 4    Right Ankle/Foot   Dorsiflexion: 4  Plantar flexion: 4    Muscle Activation     Additional Muscle Activation Details  TA: 3-/5  RA: 3-/5      Tests     Lumbar     Left   Positive slump test.     Right   Positive slump test.     Ambulation     Observational Gait   Decreased walking speed and stride length.     Additional Observational Gait Details  Patient is ambulating with no assistive device. There is noted lack of stability during ambulation secondary to left leg instability.              Precautions: Hx of lumbar laminectomies T10, T11, L1-5 2/26/2024      Date 10/17 IE       FOTO 45 SC       Manuals        Passive hamstring stretch NV                               Neuro Re-Ed        TA sets NV       TA set w/ fallouts NV                                               Ther Ex        LTR 5\" 10x       SKTC 5\" 10x       PPT NV       NuStep for strengthening NV                                       Ther Activity                        Gait Training                        Modalities                                               "

## 2024-10-17 NOTE — PROGRESS NOTES
"PT Evaluation     Today's date: 10/17/2024  Patient name: Sridhar Lion  : 1960  MRN: [id number]  Referring provider: Grzegorz Copeland MD  Dx:   Encounter Diagnosis     ICD-10-CM    1. Lumbar radiculopathy  M54.16       2. Gait abnormality  R26.9           Start Time: 1100  Stop Time: 1145  Total time in clinic (min): 45 minutes    Assessment  Impairments: abnormal gait, abnormal or restricted ROM, activity intolerance, impaired balance, impaired physical strength, lacks appropriate home exercise program, pain with function and activity limitations  Symptom irritability: moderate    Assessment details: Patient is a 63 y/o male with chief c/o LBP and L LE radiculopathy. Patient presents with mild to moderate limitations to B/L LE strength. There is also moderate limitations noted with active lumbar rom assessment. Time was spent counseling the patient on the importance of assistance from an AD such as a cane or walker to help decrease the risk of future falls secondary to LE weakness. He is agreeable to this plan and reports that he utilizes a cane at times but has left it in his car entering therapy today.     Goals  STGs:  \"Patient will be independent with hep by 2-3 visits.   Decrease low back pain by 25% for improved tolerance with adls and home duties by 3-4 weeks.   Improve Lumbar rom to wfl for improved tolerance with adls and home duties by 3-4 weeks. \"    LTGs:  \"Improve FOTO score from 45 to 48 indicating improved tolerance with activities involving the low back by discharge.   Patient will demonstrate rom and strength to the lumbar spine wfl for improved tolerance with adls and home duties by discharge.   Patient will be free of radicular symptoms by discharge. \"      Plan  Patient would benefit from: skilled physical therapy  Planned modality interventions: cryotherapy and thermotherapy: hydrocollator packs    Planned therapy interventions: abdominal trunk stabilization, ADL retraining, body " mechanics training, flexibility, functional ROM exercises, home exercise program, therapeutic exercise, therapeutic activities, stretching, strengthening, postural training, patient education, joint mobilization and manual therapy    Frequency: 1-2x week  Duration in weeks: 6  Plan of Care beginning date: 10/17/2024  Plan of Care expiration date: 2024  Treatment plan discussed with: patient  Plan details: Patient informed that from this point forward, to ensure adherence to the aforementioned plan of care, all or some of the treatment may be performed and carried out by a Physical Therapy Assistant (PTA) with supervision from a licensed Physical Therapist (PT) in accordance with [facility] Physical Therapy Practice Act.  Patient will continue to benefit from skilled physical therapy to address the functional deficits that were identified during the evaluation today. We will continue to progress the therapy program to address these functional deficits and achieve the established goals.              Subjective Evaluation    History of Present Illness  Mechanism of injury: Patient presents to out patient physical therapy with chief c/o low back pain and gait difficulties. He has a history of lumbar surgery from February of this past year. He notes that he initially noted improvements after the surgery but in August he started to notice a return of L LE symptoms and that his legs were starting to get weak on him and that he had fallen a couple times as his legs gave out on him. He notes that his back will bother him the most when he is walking or getting up from a chair.           Recurrent probem    Quality of life: good    Patient Goals  Patient goals for therapy: decreased pain, increased motion, increased strength and independence with ADLs/IADLs  Patient goal: Patient wishes to improve his balance and have less pain in his low back and L LE.  Pain  Current pain ratin  At best pain ratin  At  worst pain rating: 10  Location: Lumbar  Quality: discomfort, dull ache, radiating, throbbing and sharp  Relieving factors: rest and change in position  Aggravating factors: walking, stair climbing and standing (Sit to stand transfers)    Social Support  Lives with: significant other    Employment status: not working      Objective     Concurrent Complaints  Negative for night pain, disturbed sleep, bladder dysfunction, bowel dysfunction and saddle (S4) numbness    Active Range of Motion     Lumbar   Flexion:  with pain Restriction level: moderate  Extension:  with pain Restriction level: moderate  Left lateral flexion:  with pain Restriction level: moderate  Right lateral flexion:  with pain Restriction level: moderate  Left rotation:  with pain Restriction level: moderate  Right rotation:  with pain Restriction level: moderate    Strength/Myotome Testing     Left Hip   Planes of Motion   Flexion: 4  Extension: 4  Abduction: 4-  Adduction: 4-    Right Hip   Planes of Motion   Flexion: 4  Extension: 4  Abduction: 4-  Adduction: 4-    Left Knee   Flexion: 4  Extension: 4    Right Knee   Flexion: 4  Extension: 4    Left Ankle/Foot   Dorsiflexion: 4  Plantar flexion: 4    Right Ankle/Foot   Dorsiflexion: 4  Plantar flexion: 4    Muscle Activation     Additional Muscle Activation Details  TA: 3-/5  RA: 3-/5      Tests     Lumbar     Left   Positive slump test.     Right   Positive slump test.     Ambulation     Observational Gait   Decreased walking speed and stride length.     Additional Observational Gait Details  Patient is ambulating with no assistive device. There is noted lack of stability during ambulation secondary to left leg instability.              Precautions: Hx of lumbar laminectomies T10, T11, L1-5 2/26/2024      Date 10/17 IE       FOTO 45 SC       Manuals        Passive hamstring stretch NV                               Neuro Re-Ed        TA sets NV       TA set w/ fallouts NV                            "                    Ther Ex        LTR 5\" 10x       SKTC 5\" 10x       PPT NV       NuStep for strengthening NV                                       Ther Activity                        Gait Training                        Modalities                               "

## 2024-10-23 ENCOUNTER — APPOINTMENT (OUTPATIENT)
Dept: PHYSICAL THERAPY | Facility: CLINIC | Age: 64
End: 2024-10-23
Payer: COMMERCIAL

## 2024-10-30 ENCOUNTER — OFFICE VISIT (OUTPATIENT)
Dept: PHYSICAL THERAPY | Facility: CLINIC | Age: 64
End: 2024-10-30
Payer: COMMERCIAL

## 2024-10-30 DIAGNOSIS — R26.9 GAIT ABNORMALITY: ICD-10-CM

## 2024-10-30 DIAGNOSIS — M54.16 LUMBAR RADICULOPATHY: Primary | ICD-10-CM

## 2024-10-30 PROCEDURE — 97140 MANUAL THERAPY 1/> REGIONS: CPT | Performed by: PHYSICAL THERAPIST

## 2024-10-30 PROCEDURE — 97112 NEUROMUSCULAR REEDUCATION: CPT | Performed by: PHYSICAL THERAPIST

## 2024-10-30 PROCEDURE — 97110 THERAPEUTIC EXERCISES: CPT | Performed by: PHYSICAL THERAPIST

## 2024-10-30 NOTE — PROGRESS NOTES
"Daily Note     Today's date: 10/30/2024  Patient name: Sridhar Lion  : 1960  MRN: [id number]  Referring provider: Grzegorz Copeland MD  Dx:   Encounter Diagnosis     ICD-10-CM    1. Lumbar radiculopathy  M54.16       2. Gait abnormality  R26.9           Start Time: 1330  Stop Time: 1415  Total time in clinic (min): 45 minutes    Subjective: Patient reports that he continues to have pain in his low back and his balance is off. He notes that when he is on uneven ground this makes him more unsteady.       Objective: See treatment diary below      Assessment: Tolerated treatment fair. Patient demonstrated fatigue post treatment, exhibited good technique with therapeutic exercises, and would benefit from continued PT Patient required CGA during tandem walking and tandem stance secondary to LOB. He did utilize UE assist x2 during tandem walking and x1 during tandem stance. He responded well to progression of core/trunk strengthening without any reports of increase to baseline pain levels noted.       Plan: Continue per plan of care.  Progress treatment as tolerated.       Precautions: Hx of lumbar laminectomies T10, T11, L1-5 2024      Date 10/17 IE 10/30      FOTO 45 SC       Manuals        Passive hamstring stretch NV 30\" 5x                              Neuro Re-Ed        TA sets NV       TA set w/ fallouts NV 5\" 10x      Tandem walking  5 laps                                      Ther Ex        LTR 5\" 10x 5\" 10x      SKTC 5\" 10x 5\" 10x      PPT NV 5\" 15x      NuStep for strengthening NV L5 5 min                                      Ther Activity                        Gait Training                        Modalities                               " 68M RH w/ CAD s/p MI and stent, HTN, HLD, s/p 7 lumbar spinal surgeries, 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  Pt was seen at 8am by RN to be moving all extremities equally, alert, no slurred speech when she was cleaning his RLE surgical site cleaning. At 8:30am, pt was noted to have slurred speech, lethargic and couldn't LUE at all. No shaking movements noted. Pt reports that he doesn't remember exactly what happened this morning, but does remember people calling his name. He reports a spontaneous head bleed in  and with subsequent clipping. NIHSS: 7, preMRS: 3. No tpa due to no rapidly improving symptoms, hx of spontaneous ICH; no MT. CTH w/ R frontal encephalomalacia w/ prior R frontal temproal craniotomy and Acomm aneurysm clip. CTA neg, no VST. CTP w/ findings correlating to R frontal encephalomalacia. Neuro exam w/ equal strength in b/l UE, occasional b/l UE neg myoclonus.    Impression: L hemiplegia with lethargy and confusion, rapidly improving over 1-2 hrs, concerning for first-time seizure w/ post ictal Keny's paralysis (given prior R frontal encephalomalacia) vs. TIA/minor stroke    Plan:  1: suspected seizures: getting cont eeg: r/p stroke per neurology   2: he is suspected to have sleep apnea: he has gained weight significant amount i last one year and recently had leg surgery : per his wife he sores t home  but has not had any witnessed apneas: he would need psg as an outpt: also can monitor his o2 sao2 while sleepin/24:    1: suspected seizures: getting cont eeg: r/p stroke per neurology   2: he is suspected to have sleep apnea: he has gained weight significant amount i last one year and recently had leg surgery : per his wife he sores t home  but has not had any witnessed apneas: he would need psg as an outpt: no desaturation reported or documented: for now pt is alert and awake:     dw pt and son

## 2024-11-06 ENCOUNTER — OFFICE VISIT (OUTPATIENT)
Dept: PHYSICAL THERAPY | Facility: CLINIC | Age: 64
End: 2024-11-06
Payer: COMMERCIAL

## 2024-11-06 DIAGNOSIS — R26.9 GAIT ABNORMALITY: ICD-10-CM

## 2024-11-06 DIAGNOSIS — M54.16 LUMBAR RADICULOPATHY: Primary | ICD-10-CM

## 2024-11-06 PROCEDURE — 97112 NEUROMUSCULAR REEDUCATION: CPT

## 2024-11-06 PROCEDURE — 97110 THERAPEUTIC EXERCISES: CPT

## 2024-11-06 PROCEDURE — 97140 MANUAL THERAPY 1/> REGIONS: CPT

## 2024-11-06 NOTE — PROGRESS NOTES
"Daily Note     Today's date: 2024  Patient name: Sridhar Lion  : 1960  MRN: [id number]  Referring provider: Grzegorz Copeland MD  Dx:   Encounter Diagnosis     ICD-10-CM    1. Lumbar radiculopathy  M54.16       2. Gait abnormality  R26.9           Start Time: 1335  Stop Time: 1420  Total time in clinic (min): 45 minutes    Subjective: Pt denies radicular sx; some mild LBP present.      Objective: See treatment diary below      Assessment: Tolerated treatment fair. Patient would benefit from continued PT. PTA provided handout for active hamstring stretch for HEP.      Plan: Continue per plan of care.      Precautions: Hx of lumbar laminectomies T10, T11, L1-5 2024      Date 10/17 IE 10/30 11/6     FOTO 45 SC       Manuals        Passive hamstring stretch NV 30\" 5x ds                     Neuro Re-Ed        TA sets NV  20x 5\"     TA set w/ fallouts NV 5\" 10x 20x 5\"     Tandem walking  5 laps 5 laps     Side to side   5 laps                             Ther Ex        LTR 5\" 10x 5\" 10x 10x 5\"     SKTC 5\" 10x 5\" 10x 10x 5\"     PPT NV 5\" 15x 20x 5\"     NuStep for strengthening NV L5 5 min 10m L5                                     Ther Activity                        Gait Training                        Modalities                                 "

## 2024-11-13 ENCOUNTER — OFFICE VISIT (OUTPATIENT)
Dept: PHYSICAL THERAPY | Facility: CLINIC | Age: 64
End: 2024-11-13
Payer: COMMERCIAL

## 2024-11-13 DIAGNOSIS — R26.9 GAIT ABNORMALITY: ICD-10-CM

## 2024-11-13 DIAGNOSIS — M54.16 LUMBAR RADICULOPATHY: Primary | ICD-10-CM

## 2024-11-13 PROCEDURE — 97140 MANUAL THERAPY 1/> REGIONS: CPT

## 2024-11-13 PROCEDURE — 97110 THERAPEUTIC EXERCISES: CPT

## 2024-11-13 PROCEDURE — 97112 NEUROMUSCULAR REEDUCATION: CPT

## 2024-11-13 NOTE — PROGRESS NOTES
"Daily Note     Today's date: 2024  Patient name: Sridhar Lion  : 1960  MRN: [id number]  Referring provider: Grzegorz Copeland MD  Dx:   Encounter Diagnosis     ICD-10-CM    1. Lumbar radiculopathy  M54.16       2. Gait abnormality  R26.9                      Subjective: Pt notes slight increased weakness today; he is feeling under the weather.      Objective: See treatment diary below. FOTO 52 increased from IE.      Assessment: Tolerated treatment fair. Patient demonstrated fatigue post treatment      Plan: Continue per plan of care.      Precautions: Hx of lumbar laminectomies T10, T11, L1-5 2024      Date 10/17 IE 10/30 11/6     FOTO 45 SC  DS 52     Manuals        Passive hamstring stretch NV 30\" 5x Ds +opp k-c                     Neuro Re-Ed        TA sets NV  20x 5\"     TA set w/ fallouts NV 5\" 10x 20x 5\" 1# ea     Tandem walking  5 laps 5 laps     Side to side   5 laps                             Ther Ex        LTR 5\" 10x 5\" 10x 10x 5\"     SKTC 5\" 10x 5\" 10x 10x 5\"     PPT NV 5\" 15x 20x 5\"     NuStep for strengthening NV L5 5 min 10m L5                                     Ther Activity                        Gait Training                        Modalities        HP   Seated ds                         "

## 2024-11-20 ENCOUNTER — OFFICE VISIT (OUTPATIENT)
Dept: PHYSICAL THERAPY | Facility: CLINIC | Age: 64
End: 2024-11-20
Payer: COMMERCIAL

## 2024-11-20 DIAGNOSIS — R26.9 GAIT ABNORMALITY: ICD-10-CM

## 2024-11-20 DIAGNOSIS — M54.16 LUMBAR RADICULOPATHY: Primary | ICD-10-CM

## 2024-11-20 PROCEDURE — 97112 NEUROMUSCULAR REEDUCATION: CPT

## 2024-11-20 PROCEDURE — 97110 THERAPEUTIC EXERCISES: CPT

## 2024-11-20 NOTE — PROGRESS NOTES
"Daily Note     Today's date: 2024  Patient name: Sridhar Lion  : 1960  MRN: [id number]  Referring provider: Grzegorz Copeland MD  Dx:   Encounter Diagnosis     ICD-10-CM    1. Lumbar radiculopathy  M54.16       2. Gait abnormality  R26.9           Start Time: 1155  Stop Time: 1240  Total time in clinic (min): 45 minutes    Subjective: The patient states that he feels \"crappy\" today. He rates his low back pain at 6-7/10. He states that he still feels unsteady when he walks and uses his cane occasionally.       Objective: See treatment diary below      Assessment: Tolerated treatment well. Held manual therapy today secondary to time constraints. The patient was able to complete all exercises with no increased complaints of pain. Patient demonstrated fatigue post treatment, exhibited good technique with therapeutic exercises, and would benefit from continued PT      Plan: Continue per plan of care.      Precautions: Hx of lumbar laminectomies T10, T11, L1-5 2024      Date 10/17 IE 10/30 11/6 11/13 11/20   FOTO 45 SC  DS 52     Manuals        Passive hamstring stretch NV 30\" 5x Ds +opp k-c  NT                   Neuro Re-Ed        TA sets NV  20x 5\"  20x 5\"   TA set w/ fallouts NV 5\" 10x 20x 5\" 1# ea  20x 5\"   Tandem walking  5 laps 5 laps  5 laps   Side to side   5 laps  5 laps                           Ther Ex        LTR 5\" 10x 5\" 10x 10x 5\"  10x 5\"   SKTC 5\" 10x 5\" 10x 10x 5\"  10 x 5\"   PPT NV 5\" 15x 20x 5\"  20x 5\"   NuStep for strengthening NV L5 5 min 10m L5  10 min L5                                   Ther Activity                        Gait Training                        Modalities        HP   Seated ds                           "

## 2024-11-27 ENCOUNTER — OFFICE VISIT (OUTPATIENT)
Dept: PHYSICAL THERAPY | Facility: CLINIC | Age: 64
End: 2024-11-27
Payer: COMMERCIAL

## 2024-11-27 DIAGNOSIS — R26.9 GAIT ABNORMALITY: ICD-10-CM

## 2024-11-27 DIAGNOSIS — M54.16 LUMBAR RADICULOPATHY: Primary | ICD-10-CM

## 2024-11-27 PROCEDURE — 97110 THERAPEUTIC EXERCISES: CPT

## 2024-11-27 PROCEDURE — 97112 NEUROMUSCULAR REEDUCATION: CPT

## 2024-11-27 NOTE — PROGRESS NOTES
"Daily Note     Today's date: 2024  Patient name: Sridhar Lion  : 1960  MRN: [id number]  Referring provider: Grzegorz Copeland MD  Dx:   Encounter Diagnosis     ICD-10-CM    1. Lumbar radiculopathy  M54.16       2. Gait abnormality  R26.9                      Subjective: Pt notes  increased falling  lately. He is not using the cane at home/outside. PT recommended to resume using same.      Objective: See treatment diary below      Assessment: Tolerated treatment fair. Patient would benefit from continued PT. Slight increased fatigue today.      Plan: Continue per plan of care.      Precautions: Hx of lumbar laminectomies T10, T11, L1-5 2024      Date 11/27 10/30 11/6 11/13 11/20   FOTO   DS 52     Manuals        Passive hamstring stretch np 30\" 5x Ds +opp k-c  NT                   Neuro Re-Ed        TA sets 20x 5\"  20x 5\"  20x 5\"   TA set w/ fallouts 20x 5\" 5\" 10x 20x 5\" 1# ea  20x 5\"   Tandem walking 5 laps F/B 5 laps 5 laps  5 laps   Side to side 5 laps  5 laps  5 laps                           Ther Ex        LTR 5\" 10x 5\" 10x 10x 5\"  10x 5\"   SKTC 5\" 10x 5\" 10x 10x 5\"  10 x 5\"   PPT 20x 5\" 5\" 15x 20x 5\"  20x 5\"   NuStep for strengthening 10m L5 L5 5 min 10m L5  10 min L5                                   Ther Activity                        Gait Training                        Modalities        HP   Seated ds                             "

## 2024-12-04 ENCOUNTER — EVALUATION (OUTPATIENT)
Dept: PHYSICAL THERAPY | Facility: CLINIC | Age: 64
End: 2024-12-04
Payer: COMMERCIAL

## 2024-12-04 DIAGNOSIS — M54.16 LUMBAR RADICULOPATHY: ICD-10-CM

## 2024-12-04 DIAGNOSIS — R26.9 GAIT ABNORMALITY: Primary | ICD-10-CM

## 2024-12-04 PROCEDURE — 97110 THERAPEUTIC EXERCISES: CPT

## 2024-12-04 PROCEDURE — 97112 NEUROMUSCULAR REEDUCATION: CPT

## 2024-12-04 NOTE — PROGRESS NOTES
"PT Re-Evaluation     Today's date: 2024  Patient name: Sridhar Lion  : 1960  MRN: [id number]  Referring provider: Grzegorz Copeland MD  Dx:   Encounter Diagnosis     ICD-10-CM    1. Gait abnormality  R26.9       2. Lumbar radiculopathy  M54.16           Start Time: 1140  Stop Time: 1230  Total time in clinic (min): 50 minutes    Assessment  Impairments: abnormal gait, abnormal or restricted ROM, activity intolerance, impaired balance, impaired physical strength, lacks appropriate home exercise program, pain with function and activity limitations  Symptom irritability: moderate    Assessment details: Patient has attended 7 physical therapy visits to date. He is showing slight regression in L LE strength during resistance testing today compared to the initial evaluation. Pain levels remain elevated and he is now reporting intermittent numbness over the L anterior thigh/groin area. Lumbar rom continues to be limited secondary to increase in pain reported to the low back and B/L Les. He also continues to demonstrate noted balance deficits which seemed exacerbated today compared to his prior session. Patient is scheduled to see Dr Copeland over the next several days and will discuss his continued pain despite therapy interventions and his concern for new onset of L anterior thigh and groin symptoms.     Goals  STGs:  \"Patient will be independent with hep by 2-3 visits. - MET  Decrease low back pain by 25% for improved tolerance with adls and home duties by 3-4 weeks. - Not MET  Improve Lumbar rom to wfl for improved tolerance with adls and home duties by 3-4 weeks. \" - Not MET    LTGs:  \"Improve FOTO score from 45 to 48 indicating improved tolerance with activities involving the low back by discharge. - progressing  Patient will demonstrate rom and strength to the lumbar spine wfl for improved tolerance with adls and home duties by discharge. - Not MET  Patient will be free of radicular symptoms by discharge. " "\" - not MET      Plan  Patient would benefit from: skilled physical therapy  Planned modality interventions: cryotherapy and thermotherapy: hydrocollator packs    Planned therapy interventions: abdominal trunk stabilization, ADL retraining, body mechanics training, flexibility, functional ROM exercises, home exercise program, therapeutic exercise, therapeutic activities, stretching, strengthening, postural training, patient education, joint mobilization and manual therapy    Frequency: 1-2x week  Duration in weeks: 4  Plan of Care beginning date: 12/4/2024  Plan of Care expiration date: 1/1/2025  Treatment plan discussed with: patient  Plan details: Patient informed that from this point forward, to ensure adherence to the aforementioned plan of care, all or some of the treatment may be performed and carried out by a Physical Therapy Assistant (PTA) with supervision from a licensed Physical Therapist (PT) in accordance with [facility] Physical Therapy Practice Act.  Patient will continue to benefit from skilled physical therapy to address the functional deficits that were identified during the evaluation today. We will continue to progress the therapy program to address these functional deficits and achieve the established goals.              Subjective Evaluation    History of Present Illness  Mechanism of injury: Patient presents to out patient physical therapy with chief c/o low back pain and gait difficulties. He has a history of lumbar surgery from February of this past year. He notes that he initially noted improvements after the surgery but in August he started to notice a return of L LE symptoms and that his legs were starting to get weak on him and that he had fallen a couple times as his legs gave out on him. He notes that his back will bother him the most when he is walking or getting up from a chair.     Update 12/4/2024:  Patient reports that recently he has been getting pain into the L groin. He " finds that he also has some numbness into the L anterior thigh. Reports continued central low back pain.           Recurrent probem    Quality of life: good    Patient Goals  Patient goals for therapy: decreased pain, increased motion, increased strength and independence with ADLs/IADLs  Patient goal: Patient wishes to improve his balance and have less pain in his low back and L LE.  Pain  Current pain ratin  At best pain ratin  At worst pain rating: 10  Location: Lumbar  Quality: discomfort, dull ache, radiating, throbbing and sharp  Relieving factors: rest and change in position  Aggravating factors: walking, stair climbing and standing (Sit to stand transfers)    Social Support  Lives with: significant other    Employment status: not working      Objective     Concurrent Complaints  Negative for night pain, disturbed sleep, bladder dysfunction, bowel dysfunction and saddle (S4) numbness    Active Range of Motion     Lumbar   Flexion:  with pain Restriction level: moderate  Extension:  with pain Restriction level: moderate  Left lateral flexion:  with pain Restriction level: moderate  Right lateral flexion:  with pain Restriction level: moderate  Left rotation:  with pain Restriction level: moderate  Right rotation:  with pain Restriction level: moderate    Strength/Myotome Testing     Left Hip   Planes of Motion   Flexion: 4-  Extension: 4  Abduction: 4-  Adduction: 4+    Right Hip   Planes of Motion   Flexion: 4  Extension: 4  Abduction: 4-  Adduction: 4+    Left Knee   Flexion: 4  Extension: 4-    Right Knee   Flexion: 4  Extension: 4    Left Ankle/Foot   Dorsiflexion: 4-  Plantar flexion: 4    Right Ankle/Foot   Dorsiflexion: 4-  Plantar flexion: 4    Muscle Activation     Additional Muscle Activation Details  TA: 3-/5  RA: 3/5      Tests     Lumbar     Left   Positive slump test.     Right   Positive slump test.     Ambulation     Observational Gait   Decreased walking speed and stride length.      Additional Observational Gait Details  Patient is ambulating with no assistive device. There is noted lack of stability during ambulation secondary to left leg instability.              Precautions: Hx of lumbar laminectomies T10, T11, L1-5 2/26/2024

## 2024-12-04 NOTE — PROGRESS NOTES
"Daily Note     Today's date: 2024  Patient name: Sridhar Lion  : 1960  MRN: [id number]  Referring provider: Grzegorz Copeland MD  Dx:   Encounter Diagnosis     ICD-10-CM    1. Gait abnormality  R26.9       2. Lumbar radiculopathy  M54.16           Start Time: 1140  Stop Time: 1230  Total time in clinic (min): 50 minutes    Subjective:  My balance is still off and I have some strong  pain in my back today. I have most of the pain on my left side.  I have some pain down both legs.  My left groin has also been bothering me. That area is numb.       Objective: See treatment diary below      Assessment: Tolerated treatment well. Patient would benefit from continued PT   pt reports having some left groin pain today coming into therapy as well as pain in both legs and his left lower back. Pt has difficulty going from sitting to supine and getting back up.  He reports feeling that his balance is about the same . His Pain was rated at 8/10 today through out his session.  Pt had trouble with sidestepping in the P bars with losing his balance.       Plan: Continue per plan of care.      Precautions: Hx of lumbar laminectomies T10, T11, L1-5 2024      Date    FOTO  56 JF DS 52     Manuals        Passive hamstring stretch np  Ds +opp k-c  NT                   Neuro Re-Ed        TA sets 20x 5\" 20 x 5\" 20x 5\"  20x 5\"   TA set w/ fallouts 20x 5\" 5\" 20 x 20x 5\" 1# ea  20x 5\"   Tandem walking 5 laps F/B 5 laps 5 laps  5 laps   Side to side 5 laps 5 laps 5 laps  5 laps                           Ther Ex        LTR 5\" 10x 5\" 10x 10x 5\"  10x 5\"   SKTC 5\" 10x 5\" 10x 10x 5\"  10 x 5\"   PPT 20x 5\" 5\" 15x 20x 5\"  20x 5\"   NuStep for strengthening 10m L5 L5 5 min 10m L5  10 min L5                                   Ther Activity                        Gait Training                        Modalities        HP   Seated ds                               "

## 2024-12-04 NOTE — LETTER
2024    Grzegorz Copeland MD  [address] [facility] [address]    Patient: Sridhar Lion   YOB: 1960   Date of Visit: 2024     Encounter Diagnosis     ICD-10-CM    1. Gait abnormality  R26.9       2. Lumbar radiculopathy  M54.16           Dear Dr. Copeland:    Thank you for your recent referral of Sridhar Lion. Please review the attached evaluation summary from Sridhar's recent visit.     Please verify that you agree with the plan of care by signing the attached order.     If you have any questions or concerns, please do not hesitate to call.     I sincerely appreciate the opportunity to share in the care of one of your patients and hope to have another opportunity to work with you in the near future.       Sincerely,    Zaheer Marquez, PT      Referring Provider:      I certify that I have read the below Plan of Care and certify the need for these services furnished under this plan of treatment while under my care.                    Grzegorz Copeland MD  [address] [facility] [address]  Via Fax: [phone]          Daily Note     Today's date: 2024  Patient name: Sridhar Lion  : 1960  MRN: [id number]  Referring provider: Grzegorz Copeland MD  Dx:   Encounter Diagnosis     ICD-10-CM    1. Gait abnormality  R26.9       2. Lumbar radiculopathy  M54.16           Start Time: 1140  Stop Time: 1230  Total time in clinic (min): 50 minutes    Subjective:  My balance is still off and I have some strong  pain in my back today. I have most of the pain on my left side.  I have some pain down both legs.  My left groin has also been bothering me. That area is numb.       Objective: See treatment diary below      Assessment: Tolerated treatment well. Patient would benefit from continued PT   pt reports having some left groin pain today coming into therapy as well as pain in both legs and his left lower back. Pt has difficulty going from sitting to supine and getting back up.  He  "reports feeling that his balance is about the same . His Pain was rated at 8/10 today through out his session.  Pt had trouble with sidestepping in the P bars with losing his balance.       Plan: Continue per plan of care.      Precautions: Hx of lumbar laminectomies T10, T11, L1-5 2024      Date    FOTO  56 JF DS 52     Manuals        Passive hamstring stretch np  Ds +opp k-c  NT                   Neuro Re-Ed        TA sets 20x 5\" 20 x 5\" 20x 5\"  20x 5\"   TA set w/ fallouts 20x 5\" 5\" 20 x 20x 5\" 1# ea  20x 5\"   Tandem walking 5 laps F/B 5 laps 5 laps  5 laps   Side to side 5 laps 5 laps 5 laps  5 laps                           Ther Ex        LTR 5\" 10x 5\" 10x 10x 5\"  10x 5\"   SKTC 5\" 10x 5\" 10x 10x 5\"  10 x 5\"   PPT 20x 5\" 5\" 15x 20x 5\"  20x 5\"   NuStep for strengthening 10m L5 L5 5 min 10m L5  10 min L5                                   Ther Activity                        Gait Training                        Modalities        HP   Seated ds                                 PT Re-Evaluation     Today's date: 2024  Patient name: Sridhar Lion  : 1960  MRN: [id number]  Referring provider: Grzegorz Copeland MD  Dx:   Encounter Diagnosis     ICD-10-CM    1. Gait abnormality  R26.9       2. Lumbar radiculopathy  M54.16           Start Time: 1140  Stop Time: 1230  Total time in clinic (min): 50 minutes    Assessment  Impairments: abnormal gait, abnormal or restricted ROM, activity intolerance, impaired balance, impaired physical strength, lacks appropriate home exercise program, pain with function and activity limitations  Symptom irritability: moderate    Assessment details: Patient has attended 7 physical therapy visits to date. He is showing slight regression in L LE strength during resistance testing today compared to the initial evaluation. Pain levels remain elevated and he is now reporting intermittent numbness over the L anterior thigh/groin area. Lumbar rom continues " "to be limited secondary to increase in pain reported to the low back and B/L Les. He also continues to demonstrate noted balance deficits which seemed exacerbated today compared to his prior session. Patient is scheduled to see Dr Copeland over the next several days and will discuss his continued pain despite therapy interventions and his concern for new onset of L anterior thigh and groin symptoms.     Goals  STGs:  \"Patient will be independent with hep by 2-3 visits. - MET  Decrease low back pain by 25% for improved tolerance with adls and home duties by 3-4 weeks. - Not MET  Improve Lumbar rom to wfl for improved tolerance with adls and home duties by 3-4 weeks. \" - Not MET    LTGs:  \"Improve FOTO score from 45 to 48 indicating improved tolerance with activities involving the low back by discharge. - progressing  Patient will demonstrate rom and strength to the lumbar spine wfl for improved tolerance with adls and home duties by discharge. - Not MET  Patient will be free of radicular symptoms by discharge. \" - not MET      Plan  Patient would benefit from: skilled physical therapy  Planned modality interventions: cryotherapy and thermotherapy: hydrocollator packs    Planned therapy interventions: abdominal trunk stabilization, ADL retraining, body mechanics training, flexibility, functional ROM exercises, home exercise program, therapeutic exercise, therapeutic activities, stretching, strengthening, postural training, patient education, joint mobilization and manual therapy    Frequency: 1-2x week  Duration in weeks: 4  Plan of Care beginning date: 12/4/2024  Plan of Care expiration date: 1/1/2025  Treatment plan discussed with: patient  Plan details: Patient informed that from this point forward, to ensure adherence to the aforementioned plan of care, all or some of the treatment may be performed and carried out by a Physical Therapy Assistant (PTA) with supervision from a licensed Physical Therapist (PT) in " accordance with [facility] Physical Therapy Practice Act.  Patient will continue to benefit from skilled physical therapy to address the functional deficits that were identified during the evaluation today. We will continue to progress the therapy program to address these functional deficits and achieve the established goals.              Subjective Evaluation    History of Present Illness  Mechanism of injury: Patient presents to out patient physical therapy with chief c/o low back pain and gait difficulties. He has a history of lumbar surgery from February of this past year. He notes that he initially noted improvements after the surgery but in August he started to notice a return of L LE symptoms and that his legs were starting to get weak on him and that he had fallen a couple times as his legs gave out on him. He notes that his back will bother him the most when he is walking or getting up from a chair.     Update 2024:  Patient reports that recently he has been getting pain into the L groin. He finds that he also has some numbness into the L anterior thigh. Reports continued central low back pain.           Recurrent probem    Quality of life: good    Patient Goals  Patient goals for therapy: decreased pain, increased motion, increased strength and independence with ADLs/IADLs  Patient goal: Patient wishes to improve his balance and have less pain in his low back and L LE.  Pain  Current pain ratin  At best pain ratin  At worst pain rating: 10  Location: Lumbar  Quality: discomfort, dull ache, radiating, throbbing and sharp  Relieving factors: rest and change in position  Aggravating factors: walking, stair climbing and standing (Sit to stand transfers)    Social Support  Lives with: significant other    Employment status: not working      Objective     Concurrent Complaints  Negative for night pain, disturbed sleep, bladder dysfunction, bowel dysfunction and saddle (S4) numbness    Active  Range of Motion     Lumbar   Flexion:  with pain Restriction level: moderate  Extension:  with pain Restriction level: moderate  Left lateral flexion:  with pain Restriction level: moderate  Right lateral flexion:  with pain Restriction level: moderate  Left rotation:  with pain Restriction level: moderate  Right rotation:  with pain Restriction level: moderate    Strength/Myotome Testing     Left Hip   Planes of Motion   Flexion: 4-  Extension: 4  Abduction: 4-  Adduction: 4+    Right Hip   Planes of Motion   Flexion: 4  Extension: 4  Abduction: 4-  Adduction: 4+    Left Knee   Flexion: 4  Extension: 4-    Right Knee   Flexion: 4  Extension: 4    Left Ankle/Foot   Dorsiflexion: 4-  Plantar flexion: 4    Right Ankle/Foot   Dorsiflexion: 4-  Plantar flexion: 4    Muscle Activation     Additional Muscle Activation Details  TA: 3-/5  RA: 3/5      Tests     Lumbar     Left   Positive slump test.     Right   Positive slump test.     Ambulation     Observational Gait   Decreased walking speed and stride length.     Additional Observational Gait Details  Patient is ambulating with no assistive device. There is noted lack of stability during ambulation secondary to left leg instability.              Precautions: Hx of lumbar laminectomies T10, T11, L1-5 2/26/2024

## 2024-12-09 ENCOUNTER — OFFICE VISIT (OUTPATIENT)
Dept: URGENT CARE | Facility: CLINIC | Age: 64
End: 2024-12-09
Payer: COMMERCIAL

## 2024-12-09 ENCOUNTER — APPOINTMENT (OUTPATIENT)
Dept: RADIOLOGY | Facility: CLINIC | Age: 64
End: 2024-12-09
Payer: COMMERCIAL

## 2024-12-09 VITALS
SYSTOLIC BLOOD PRESSURE: 130 MMHG | HEART RATE: 76 BPM | WEIGHT: 213 LBS | OXYGEN SATURATION: 97 % | DIASTOLIC BLOOD PRESSURE: 80 MMHG | TEMPERATURE: 97.6 F | RESPIRATION RATE: 20 BRPM | BODY MASS INDEX: 35.49 KG/M2 | HEIGHT: 65 IN

## 2024-12-09 DIAGNOSIS — S99.911A RIGHT ANKLE INJURY, INITIAL ENCOUNTER: ICD-10-CM

## 2024-12-09 DIAGNOSIS — S93.401A SPRAIN OF RIGHT ANKLE, UNSPECIFIED LIGAMENT, INITIAL ENCOUNTER: Primary | ICD-10-CM

## 2024-12-09 PROCEDURE — 99203 OFFICE O/P NEW LOW 30 MIN: CPT

## 2024-12-09 PROCEDURE — 73610 X-RAY EXAM OF ANKLE: CPT

## 2024-12-09 RX ORDER — FERROUS SULFATE 325(65) MG
TABLET ORAL
COMMUNITY
Start: 2024-11-30

## 2024-12-09 RX ORDER — SEMAGLUTIDE 0.68 MG/ML
INJECTION, SOLUTION SUBCUTANEOUS
COMMUNITY
Start: 2024-08-20

## 2024-12-09 RX ORDER — POTASSIUM CHLORIDE 750 MG/1
CAPSULE, EXTENDED RELEASE ORAL
COMMUNITY
Start: 2024-11-30

## 2024-12-09 RX ORDER — LINAGLIPTIN 5 MG/1
TABLET, FILM COATED ORAL
COMMUNITY
Start: 2024-11-30

## 2024-12-09 RX ORDER — CELECOXIB 200 MG/1
CAPSULE ORAL
COMMUNITY
Start: 2024-08-27

## 2024-12-09 NOTE — PATIENT INSTRUCTIONS
Tylenol or Ibuprofen as needed for pain  Apply ice 15 minutes every few hours for 48 hours from the time of injury  Elevate  Gentle range of motion exercises  If symptoms fail to improve follow up with orthopedics    If tests have been performed at [facility], our office will contact you with results if changes need to be made to the care plan discussed with you at the visit.  You [facility]

## 2024-12-09 NOTE — PROGRESS NOTES
[facility]        NAME: Sridhar Lion is a 64 y.o. male  : 1960    MRN: [id number]  DATE: 2024  TIME: 10:14 AM    Assessment and Plan   Right ankle injury, initial encounter [S99.911A]  1. Right ankle injury, initial encounter  XR ankle 3+ vw right            Patient Instructions     Tylenol or Ibuprofen as needed for pain  Apply ice 15 minutes every few hours for 48 hours from the time of injury  Elevate  Gentle range of motion exercises  If symptoms fail to improve follow up with orthopedics    Follow up with PCP in 3-5 days.  Proceed to  ER if symptoms worsen.    If tests have been performed at [facility], our office will contact you with results if changes need to be made to the care plan discussed with you at the visit.  [facility]    Chief Complaint     Chief Complaint   Patient presents with    Ankle Injury     Patient states that  he was putting the Say light and fell and twisted right ankle onset yesterday          History of Present Illness       Patient presents with right ankle pain which started when he fell through a wooden platform putting up [employer] lights.  Injury occurred yesterday.  Patient is having swelling and pain primarily over the lateral aspect of his right ankle        Review of Systems   Review of Systems   Constitutional:  Negative for fever.   Musculoskeletal:         Right ankle pain and swelling   Skin:  Negative for wound.   Neurological:  Negative for weakness and numbness.         Current Medications       Current Outpatient Medications:     acetaminophen (TYLENOL) 325 mg tablet, Take 2 tablets (650 mg total) by mouth every 4 (four) hours as needed for mild pain, Disp: , Rfl: 0    aspirin 81 mg chewable tablet, Chew 1 tablet (81 mg total) daily Do not start before 2023., Disp: , Rfl: 0    atorvastatin (LIPITOR) 20 mg tablet, Take 20 mg by mouth daily, Disp: , Rfl:     celecoxib (CeleBREX)  200 mg capsule, , Disp: , Rfl:     doxazosin (CARDURA) 2 mg tablet, Take 4 mg by mouth daily at bedtime, Disp: , Rfl:     FeroSul 325 (65 Fe) MG tablet, , Disp: , Rfl:     FLUoxetine (PROzac) 40 MG capsule, Take 40 mg by mouth daily, Disp: , Rfl:     labetalol (NORMODYNE) 100 mg tablet, Take 50 mg by mouth 2 (two) times a day, Disp: , Rfl:     losartan (COZAAR) 50 mg tablet, Take 50 mg by mouth daily, Disp: , Rfl:     omeprazole (PriLOSEC) 20 mg delayed release capsule, Take 20 mg by mouth daily, Disp: , Rfl:     Ozempic, 0.25 or 0.5 MG/DOSE, 2 MG/3ML injection pen, , Disp: , Rfl:     potassium chloride (MICRO-K) 10 MEQ CR capsule, , Disp: , Rfl:     pregabalin (LYRICA) 50 mg capsule, Take 50 mg by mouth daily, Disp: , Rfl:     senna-docusate sodium (SENOKOT S) 8.6-50 mg per tablet, Take 1 tablet by mouth daily at bedtime, Disp: 30 tablet, Rfl: 0    Tradjenta 5 MG TABS, , Disp: , Rfl:     traZODone (DESYREL) 100 mg tablet, Take 100 mg by mouth daily at bedtime, Disp: , Rfl:     oxyCODONE (ROXICODONE) 5 immediate release tablet, You may take 2.5 mg (0.5 tab) for moderate pain or 5 mg (1 tab) for severe pain, every 4 hours, as needed. (Patient not taking: Reported on 12/9/2024), Disp: 20 tablet, Rfl: 0    Current Allergies     Allergies as of 12/09/2024    (No Known Allergies)            The following portions of the patient's history were reviewed and updated as appropriate: allergies, current medications, past family history, past medical history, past social history, past surgical history and problem list.     Past Medical History:   Diagnosis Date    COPD (chronic obstructive pulmonary disease) (HCC)     Diabetes mellitus (HCC)     Hypertension     Pulmonary emboli (HCC)        Past Surgical History:   Procedure Laterality Date    APPENDECTOMY      BACK SURGERY      EYE SURGERY      LAMINECTOMY         Family History   Problem Relation Age of Onset    Cancer Father          Medications have been  "verified.        Objective   /80 (BP Location: Left arm, Patient Position: Sitting, Cuff Size: Adult)   Pulse 76   Temp 97.6 °F (36.4 °C) (Temporal)   Resp 20   Ht 5' 5\" (1.651 m)   Wt 96.6 kg (213 lb)   SpO2 97%   BMI 35.45 kg/m²   No LMP for male patient.       Physical Exam     Physical Exam  Vitals and nursing note reviewed.   Constitutional:       Appearance: Normal appearance.   HENT:      Head: Normocephalic and atraumatic.   Cardiovascular:      Rate and Rhythm: Normal rate.   Pulmonary:      Effort: Pulmonary effort is normal.   Musculoskeletal:      Comments: Right ankle with swelling over lateral aspect, ecchymosis along base of foot lateral aspect. TTP over distal fibula. Diminished ROM, sensation intact to light touch, DP pulse intact   Skin:     General: Skin is warm.   Neurological:      Mental Status: He is alert.   Xray independently reviewed by me contemporaneously as STS,  no acute osseous abnormality or acute process. Awaiting radiology interpretation.    Patient declined crutches for ambulation            "

## 2024-12-10 ENCOUNTER — RESULTS FOLLOW-UP (OUTPATIENT)
Dept: URGENT CARE | Facility: CLINIC | Age: 64
End: 2024-12-10

## 2025-02-01 ENCOUNTER — OFFICE VISIT (OUTPATIENT)
Dept: URGENT CARE | Facility: CLINIC | Age: 65
End: 2025-02-01
Payer: COMMERCIAL

## 2025-02-01 VITALS
DIASTOLIC BLOOD PRESSURE: 72 MMHG | BODY MASS INDEX: 29.16 KG/M2 | WEIGHT: 175 LBS | RESPIRATION RATE: 20 BRPM | SYSTOLIC BLOOD PRESSURE: 120 MMHG | TEMPERATURE: 98.6 F | HEIGHT: 65 IN | OXYGEN SATURATION: 98 % | HEART RATE: 78 BPM

## 2025-02-01 DIAGNOSIS — R30.0 DYSURIA: Primary | ICD-10-CM

## 2025-02-01 DIAGNOSIS — N30.90 CYSTITIS: Primary | ICD-10-CM

## 2025-02-01 LAB
SL AMB  POCT GLUCOSE, UA: NEGATIVE
SL AMB LEUKOCYTE ESTERASE,UA: NEGATIVE
SL AMB POCT BILIRUBIN,UA: ABNORMAL
SL AMB POCT BLOOD,UA: NEGATIVE
SL AMB POCT CLARITY,UA: CLEAR
SL AMB POCT COLOR,UA: YELLOW
SL AMB POCT KETONES,UA: NEGATIVE
SL AMB POCT NITRITE,UA: NEGATIVE
SL AMB POCT PH,UA: 7.5
SL AMB POCT SPECIFIC GRAVITY,UA: 1.01
SL AMB POCT URINE PROTEIN: ABNORMAL
SL AMB POCT UROBILINOGEN: 1

## 2025-02-01 PROCEDURE — G0463 HOSPITAL OUTPT CLINIC VISIT: HCPCS

## 2025-02-01 PROCEDURE — 81002 URINALYSIS NONAUTO W/O SCOPE: CPT

## 2025-02-01 PROCEDURE — 87086 URINE CULTURE/COLONY COUNT: CPT

## 2025-02-01 PROCEDURE — 99213 OFFICE O/P EST LOW 20 MIN: CPT

## 2025-02-01 RX ORDER — NITROFURANTOIN 25; 75 MG/1; MG/1
100 CAPSULE ORAL 2 TIMES DAILY
Qty: 10 CAPSULE | Refills: 0 | Status: SHIPPED | OUTPATIENT
Start: 2025-02-01 | End: 2025-02-06

## 2025-02-01 NOTE — PROGRESS NOTES
[facility] Now        NAME: Sridhar Lion is a 64 y.o. male  : 1960    MRN: [id number]  DATE: 2025  TIME: 12:05 PM    Assessment and Plan   Dysuria [R30.0]  1. Dysuria  POCT urine dip            Patient Instructions       Follow up with PCP in 3-5 days.  Proceed to  ER if symptoms worsen.    If tests are performed, our office will contact you with results only if changes need to made to the care plan discussed with you at the visit. You can review your full results on [facility].    Chief Complaint   No chief complaint on file.        History of Present Illness       HPI    Review of Systems   Review of Systems      Current Medications       Current Outpatient Medications:     acetaminophen (TYLENOL) 325 mg tablet, Take 2 tablets (650 mg total) by mouth every 4 (four) hours as needed for mild pain, Disp: , Rfl: 0    aspirin 81 mg chewable tablet, Chew 1 tablet (81 mg total) daily Do not start before 2023., Disp: , Rfl: 0    atorvastatin (LIPITOR) 20 mg tablet, Take 20 mg by mouth daily, Disp: , Rfl:     celecoxib (CeleBREX) 200 mg capsule, , Disp: , Rfl:     doxazosin (CARDURA) 2 mg tablet, Take 4 mg by mouth daily at bedtime, Disp: , Rfl:     FeroSul 325 (65 Fe) MG tablet, , Disp: , Rfl:     FLUoxetine (PROzac) 40 MG capsule, Take 40 mg by mouth daily, Disp: , Rfl:     labetalol (NORMODYNE) 100 mg tablet, Take 150 mg by mouth 2 (two) times a day, Disp: , Rfl:     losartan (COZAAR) 50 mg tablet, Take 50 mg by mouth daily, Disp: , Rfl:     nitrofurantoin (MACROBID) 100 mg capsule, Take 1 capsule (100 mg total) by mouth 2 (two) times a day for 5 days, Disp: 10 capsule, Rfl: 0    omeprazole (PriLOSEC) 20 mg delayed release capsule, Take 20 mg by mouth daily, Disp: , Rfl:     oxyCODONE (ROXICODONE) 5 immediate release tablet, You may take 2.5 mg (0.5 tab) for moderate pain or 5 mg (1 tab) for severe pain, every 4 hours, as needed. (Patient not taking: Reported on  2/1/2025), Disp: 20 tablet, Rfl: 0    Ozempic, 0.25 or 0.5 MG/DOSE, 2 MG/3ML injection pen, , Disp: , Rfl:     potassium chloride (MICRO-K) 10 MEQ CR capsule, , Disp: , Rfl:     pregabalin (LYRICA) 50 mg capsule, Take 50 mg by mouth daily, Disp: , Rfl:     senna-docusate sodium (SENOKOT S) 8.6-50 mg per tablet, Take 1 tablet by mouth daily at bedtime, Disp: 30 tablet, Rfl: 0    Tradjenta 5 MG TABS, , Disp: , Rfl:     traZODone (DESYREL) 100 mg tablet, Take 100 mg by mouth daily at bedtime, Disp: , Rfl:     Current Allergies     Allergies as of 02/01/2025    (No Known Allergies)            The following portions of the patient's history were reviewed and updated as appropriate: allergies, current medications, past family history, past medical history, past social history, past surgical history and problem list.     Past Medical History:   Diagnosis Date    COPD (chronic obstructive pulmonary disease) (HCC)     Diabetes mellitus (HCC)     Hypertension     Pulmonary emboli (HCC)        Past Surgical History:   Procedure Laterality Date    APPENDECTOMY      BACK SURGERY      EYE SURGERY      LAMINECTOMY         Family History   Problem Relation Age of Onset    Cancer Father          Medications have been verified.        Objective   There were no vitals taken for this visit.       Physical Exam     Physical Exam

## 2025-02-01 NOTE — PROGRESS NOTES
[facility] Now        NAME: Sridhar Lion is a 64 y.o. male  : 1960    MRN: [id number]  DATE: 2025  TIME: 2:29 PM    Assessment and Plan   Cystitis [N30.90]  1. Cystitis  nitrofurantoin (MACROBID) 100 mg capsule    Urine culture    Urine culture      Patient on cardura and takes celebrex daily  Cannot rule out renal calculi, patient wants to follow up with pcp on Monday and will go to ED if condition worsens  POCT urine negative for leukocytes and nitrates, will treat and wait for culture  Right and left cVA tenderness on palpaiton, afebrile  Patient Instructions   Antibiotic as directed   ED for further evaluation of renal stones if symtoms worsen   Keep appt with dr reed    Follow up with PCP in 3-5 days.  Proceed to  ER if symptoms worsen.    If tests are performed, our office will contact you with results only if changes need to made to the care plan discussed with you at the visit. [facility]    Chief Complaint     Chief Complaint   Patient presents with    Abdominal Pain     Pt c/o lower abdominal pain radiating to lower back, intermittent mid abdominal pain, and rib pain x3 weeks. Pt denies N/V, but intermittent diarrhea.     Difficulty Urinating     Pt c/o pain with urination and frequency since yesterday.          History of Present Illness       Patient presents with suprapubic abdominal pain radiating at times to back on both sides. He report urinary pain and frequency. He reported he may have pulled muscle in his back as he was lifting heaving box. He does not want to be seen in ED and has a follow up appointment with pcp on Monday. His pcp was made aware of symptoms last week and ordered labs which were unremarkable         Review of Systems   Review of Systems   Genitourinary:  Positive for urgency.   All other systems reviewed and are negative.        Current Medications       Current Outpatient Medications:     acetaminophen  (TYLENOL) 325 mg tablet, Take 2 tablets (650 mg total) by mouth every 4 (four) hours as needed for mild pain, Disp: , Rfl: 0    aspirin 81 mg chewable tablet, Chew 1 tablet (81 mg total) daily Do not start before April 28, 2023., Disp: , Rfl: 0    atorvastatin (LIPITOR) 20 mg tablet, Take 20 mg by mouth daily, Disp: , Rfl:     celecoxib (CeleBREX) 200 mg capsule, , Disp: , Rfl:     doxazosin (CARDURA) 2 mg tablet, Take 4 mg by mouth daily at bedtime, Disp: , Rfl:     FeroSul 325 (65 Fe) MG tablet, , Disp: , Rfl:     FLUoxetine (PROzac) 40 MG capsule, Take 40 mg by mouth daily, Disp: , Rfl:     labetalol (NORMODYNE) 100 mg tablet, Take 150 mg by mouth 2 (two) times a day, Disp: , Rfl:     losartan (COZAAR) 50 mg tablet, Take 50 mg by mouth daily, Disp: , Rfl:     nitrofurantoin (MACROBID) 100 mg capsule, Take 1 capsule (100 mg total) by mouth 2 (two) times a day for 5 days, Disp: 10 capsule, Rfl: 0    omeprazole (PriLOSEC) 20 mg delayed release capsule, Take 20 mg by mouth daily, Disp: , Rfl:     Ozempic, 0.25 or 0.5 MG/DOSE, 2 MG/3ML injection pen, , Disp: , Rfl:     potassium chloride (MICRO-K) 10 MEQ CR capsule, , Disp: , Rfl:     pregabalin (LYRICA) 50 mg capsule, Take 50 mg by mouth daily, Disp: , Rfl:     senna-docusate sodium (SENOKOT S) 8.6-50 mg per tablet, Take 1 tablet by mouth daily at bedtime, Disp: 30 tablet, Rfl: 0    Tradjenta 5 MG TABS, , Disp: , Rfl:     traZODone (DESYREL) 100 mg tablet, Take 100 mg by mouth daily at bedtime, Disp: , Rfl:     oxyCODONE (ROXICODONE) 5 immediate release tablet, You may take 2.5 mg (0.5 tab) for moderate pain or 5 mg (1 tab) for severe pain, every 4 hours, as needed. (Patient not taking: Reported on 2/1/2025), Disp: 20 tablet, Rfl: 0    Current Allergies     Allergies as of 02/01/2025    (No Known Allergies)            The following portions of the patient's history were reviewed and updated as appropriate: allergies, current medications, past family history, past  "medical history, past social history, past surgical history and problem list.     Past Medical History:   Diagnosis Date    COPD (chronic obstructive pulmonary disease) (HCC)     Diabetes mellitus (HCC)     Hypertension     Pulmonary emboli (HCC)        Past Surgical History:   Procedure Laterality Date    APPENDECTOMY      BACK SURGERY      EYE SURGERY      LAMINECTOMY         Family History   Problem Relation Age of Onset    Cancer Father          Medications have been verified.        Objective   /72 (BP Location: Right arm, Patient Position: Sitting, Cuff Size: Adult)   Pulse 78   Temp 98.6 °F (37 °C) (Tympanic)   Resp 20   Ht 5' 5\" (1.651 m)   Wt 79.4 kg (175 lb)   SpO2 98%   BMI 29.12 kg/m²        Physical Exam     Physical Exam  Vitals and nursing note reviewed.   Constitutional:       Appearance: Normal appearance.   HENT:      Head: Normocephalic.      Right Ear: Tympanic membrane, ear canal and external ear normal.      Left Ear: Tympanic membrane, ear canal and external ear normal.      Nose: Nose normal. No rhinorrhea.      Mouth/Throat:      Mouth: Mucous membranes are moist.      Pharynx: Oropharynx is clear.   Eyes:      Conjunctiva/sclera: Conjunctivae normal.      Pupils: Pupils are equal, round, and reactive to light.   Cardiovascular:      Rate and Rhythm: Normal rate and regular rhythm.      Pulses: Normal pulses.      Heart sounds: Normal heart sounds. No murmur heard.  Pulmonary:      Effort: Pulmonary effort is normal. No respiratory distress.      Breath sounds: Normal breath sounds. No wheezing or rhonchi.   Abdominal:      General: Abdomen is flat. Bowel sounds are normal.      Palpations: Abdomen is soft.      Tenderness: There is abdominal tenderness in the suprapubic area. There is right CVA tenderness and left CVA tenderness.   Musculoskeletal:         General: Normal range of motion.      Cervical back: Normal range of motion.   Lymphadenopathy:      Cervical: No cervical " adenopathy.   Skin:     General: Skin is warm and dry.   Neurological:      General: No focal deficit present.      Mental Status: He is alert and oriented to person, place, and time.

## 2025-02-01 NOTE — PATIENT INSTRUCTIONS
Antibiotic as directed   ED for further evaluation of renal stones if symtoms worsen   Keep appt with dr reed    Follow up with PCP in 3-5 days.  Proceed to  ER if symptoms worsen.    If tests are performed, our office will contact you with results only if changes need to made to the care plan discussed with you at the visit. You can review your full results on [facility].   Continue with atorvastatin.  Check CMP and lipids.

## 2025-02-03 ENCOUNTER — RESULTS FOLLOW-UP (OUTPATIENT)
Dept: URGENT CARE | Facility: CLINIC | Age: 65
End: 2025-02-03

## 2025-02-03 LAB — BACTERIA UR CULT: NORMAL

## 2025-02-04 ENCOUNTER — HOSPITAL ENCOUNTER (EMERGENCY)
Facility: HOSPITAL | Age: 65
Discharge: HOME | End: 2025-02-04
Attending: EMERGENCY MEDICINE
Payer: COMMERCIAL

## 2025-02-04 ENCOUNTER — APPOINTMENT (EMERGENCY)
Dept: CT IMAGING | Facility: HOSPITAL | Age: 65
End: 2025-02-04
Payer: COMMERCIAL

## 2025-02-04 VITALS
OXYGEN SATURATION: 96 % | TEMPERATURE: 97.6 F | DIASTOLIC BLOOD PRESSURE: 76 MMHG | HEART RATE: 71 BPM | RESPIRATION RATE: 18 BRPM | SYSTOLIC BLOOD PRESSURE: 132 MMHG

## 2025-02-04 DIAGNOSIS — E87.6 HYPOKALEMIA: ICD-10-CM

## 2025-02-04 DIAGNOSIS — R10.9 FLANK PAIN: Primary | ICD-10-CM

## 2025-02-04 LAB
ALBUMIN SERPL BCG-MCNC: 4.3 G/DL (ref 3.5–5)
ALP SERPL-CCNC: 146 U/L (ref 34–104)
ALT SERPL W P-5'-P-CCNC: 14 U/L (ref 7–52)
ANION GAP SERPL CALCULATED.3IONS-SCNC: 8 MMOL/L (ref 4–13)
AST SERPL W P-5'-P-CCNC: 16 U/L (ref 13–39)
BASOPHILS # BLD AUTO: 0.08 THOUSANDS/ΜL (ref 0–0.1)
BASOPHILS NFR BLD AUTO: 1 % (ref 0–1)
BILIRUB SERPL-MCNC: 1.19 MG/DL (ref 0.2–1)
BILIRUB UR QL STRIP: NEGATIVE
BUN SERPL-MCNC: 11 MG/DL (ref 5–25)
CALCIUM SERPL-MCNC: 9.2 MG/DL (ref 8.4–10.2)
CHLORIDE SERPL-SCNC: 106 MMOL/L (ref 96–108)
CLARITY UR: CLEAR
CO2 SERPL-SCNC: 27 MMOL/L (ref 21–32)
COLOR UR: YELLOW
CREAT SERPL-MCNC: 0.98 MG/DL (ref 0.6–1.3)
EOSINOPHIL # BLD AUTO: 0.11 THOUSAND/ΜL (ref 0–0.61)
EOSINOPHIL NFR BLD AUTO: 1 % (ref 0–6)
ERYTHROCYTE [DISTWIDTH] IN BLOOD BY AUTOMATED COUNT: 14.4 % (ref 11.6–15.1)
GFR SERPL CREATININE-BSD FRML MDRD: 81 ML/MIN/1.73SQ M
GLUCOSE SERPL-MCNC: 129 MG/DL (ref 65–140)
GLUCOSE UR STRIP-MCNC: NEGATIVE MG/DL
HCT VFR BLD AUTO: 41.6 % (ref 36.5–49.3)
HGB BLD-MCNC: 13.4 G/DL (ref 12–17)
HGB UR QL STRIP.AUTO: NEGATIVE
IMM GRANULOCYTES # BLD AUTO: 0.02 THOUSAND/UL (ref 0–0.2)
IMM GRANULOCYTES NFR BLD AUTO: 0 % (ref 0–2)
KETONES UR STRIP-MCNC: NEGATIVE MG/DL
LEUKOCYTE ESTERASE UR QL STRIP: NEGATIVE
LIPASE SERPL-CCNC: 16 U/L (ref 11–82)
LYMPHOCYTES # BLD AUTO: 1.13 THOUSANDS/ΜL (ref 0.6–4.47)
LYMPHOCYTES NFR BLD AUTO: 13 % (ref 14–44)
MCH RBC QN AUTO: 30.2 PG (ref 26.8–34.3)
MCHC RBC AUTO-ENTMCNC: 32.2 G/DL (ref 31.4–37.4)
MCV RBC AUTO: 94 FL (ref 82–98)
MONOCYTES # BLD AUTO: 0.45 THOUSAND/ΜL (ref 0.17–1.22)
MONOCYTES NFR BLD AUTO: 5 % (ref 4–12)
NEUTROPHILS # BLD AUTO: 6.7 THOUSANDS/ΜL (ref 1.85–7.62)
NEUTS SEG NFR BLD AUTO: 80 % (ref 43–75)
NITRITE UR QL STRIP: NEGATIVE
NRBC BLD AUTO-RTO: 0 /100 WBCS
PH UR STRIP.AUTO: 7 [PH]
PLATELET # BLD AUTO: 215 THOUSANDS/UL (ref 149–390)
PMV BLD AUTO: 10.6 FL (ref 8.9–12.7)
POTASSIUM SERPL-SCNC: 3.4 MMOL/L (ref 3.5–5.3)
PROT SERPL-MCNC: 7.1 G/DL (ref 6.4–8.4)
PROT UR STRIP-MCNC: NEGATIVE MG/DL
RBC # BLD AUTO: 4.44 MILLION/UL (ref 3.88–5.62)
SODIUM SERPL-SCNC: 141 MMOL/L (ref 135–147)
SP GR UR STRIP.AUTO: <1.005 (ref 1–1.03)
UROBILINOGEN UR STRIP-ACNC: <2 MG/DL
WBC # BLD AUTO: 8.49 THOUSAND/UL (ref 4.31–10.16)

## 2025-02-04 PROCEDURE — 96360 HYDRATION IV INFUSION INIT: CPT

## 2025-02-04 PROCEDURE — 36415 COLL VENOUS BLD VENIPUNCTURE: CPT | Performed by: PHYSICIAN ASSISTANT

## 2025-02-04 PROCEDURE — 99285 EMERGENCY DEPT VISIT HI MDM: CPT | Performed by: PHYSICIAN ASSISTANT

## 2025-02-04 PROCEDURE — 74177 CT ABD & PELVIS W/CONTRAST: CPT

## 2025-02-04 PROCEDURE — 81003 URINALYSIS AUTO W/O SCOPE: CPT | Performed by: PHYSICIAN ASSISTANT

## 2025-02-04 PROCEDURE — 80053 COMPREHEN METABOLIC PANEL: CPT | Performed by: PHYSICIAN ASSISTANT

## 2025-02-04 PROCEDURE — 83690 ASSAY OF LIPASE: CPT | Performed by: PHYSICIAN ASSISTANT

## 2025-02-04 PROCEDURE — 99284 EMERGENCY DEPT VISIT MOD MDM: CPT

## 2025-02-04 PROCEDURE — 85025 COMPLETE CBC W/AUTO DIFF WBC: CPT | Performed by: PHYSICIAN ASSISTANT

## 2025-02-04 RX ORDER — POTASSIUM CHLORIDE 1500 MG/1
40 TABLET, EXTENDED RELEASE ORAL ONCE
Status: COMPLETED | OUTPATIENT
Start: 2025-02-04 | End: 2025-02-04

## 2025-02-04 RX ADMIN — SODIUM CHLORIDE 1000 ML: 0.9 INJECTION, SOLUTION INTRAVENOUS at 11:28

## 2025-02-04 RX ADMIN — POTASSIUM CHLORIDE 40 MEQ: 1500 TABLET, EXTENDED RELEASE ORAL at 12:27

## 2025-02-04 RX ADMIN — IOHEXOL 100 ML: 350 INJECTION, SOLUTION INTRAVENOUS at 11:57

## 2025-02-04 NOTE — ED PROVIDER NOTES
Time reflects when diagnosis was documented in both MDM as applicable and the Disposition within this note       Time User Action Codes Description Comment    2/4/2025 12:22 PM Tello Faye Add [R10.9] Flank pain     2/4/2025 12:22 PM Tello Faye Add [E87.6] Hypokalemia           ED Disposition       ED Disposition   Discharge    Condition   Stable    Date/Time   Tue Feb 4, 2025 12:22 PM    Comment   Sridhar Lion discharge to home/self care.                   Assessment & Plan       Medical Decision Making  64-year-old male here for evaluation of right low back pain as well as right side abdominal pain with constipation.  Differential diagnosis includes ureteral calculi, pyelonephritis, hydronephrosis, UTI, bowel obstruction, mesenteric adenitis, constipation, obstipation.    Workup is grossly unremarkable.  CT scan of the abdomen does not show acute pathology outside of slightly thick bladder wall however urinalysis is not consistent with infection he is already on Macrobid.  He stable for discharge home and can follow-up with primary care.    Amount and/or Complexity of Data Reviewed  Labs: ordered. Decision-making details documented in ED Course.  Radiology: ordered.    Risk  Prescription drug management.        ED Course as of 02/04/25 1251   Tue Feb 04, 2025   1129 Blood Pressure: 141/82   1129 Temperature: 97.6 °F (36.4 °C)   1129 Pulse: 73   1129 Respirations: 18   1129 SpO2: 95 %  Vital signs reviewed within normal limits.   1146 WBC: 8.49   1146 Hemoglobin: 13.4   1146 Platelet Count: 215  Cbc reviewed wnl   1208 Awaiting urine and ct interp   1221 IMPRESSION:     No acute findings in the abdomen or pelvis.     Urinary bladder wall thickening which could reflect sequela of chronic bladder outlet obstruction versus cystitis.     Persistent small bilateral pleural effusions.     1249 Leukocytes, UA: Negative   1249 Nitrite, UA: Negative       Medications   sodium chloride 0.9 % bolus 1,000 mL (0  mL Intravenous Stopped 2/4/25 1227)   iohexol (OMNIPAQUE) 350 MG/ML injection (MULTI-DOSE) 100 mL (100 mL Intravenous Given 2/4/25 1157)   potassium chloride (Klor-Con M20) CR tablet 40 mEq (40 mEq Oral Given 2/4/25 1227)       ED Risk Strat Scores                          SBIRT 22yo+      Flowsheet Row Most Recent Value   Initial Alcohol Screen: US AUDIT-C     1. How often do you have a drink containing alcohol? 0 Filed at: 02/04/2025 1041   2. How many drinks containing alcohol do you have on a typical day you are drinking?  0 Filed at: 02/04/2025 1041   3a. Male UNDER 65: How often do you have five or more drinks on one occasion? 0 Filed at: 02/04/2025 1041   3b. FEMALE Any Age, or MALE 65+: How often do you have 4 or more drinks on one occassion? 0 Filed at: 02/04/2025 1041   Audit-C Score 0 Filed at: 02/04/2025 1041   KENDRICK: How many times in the past year have you...    Used an illegal drug or used a prescription medication for non-medical reasons? Never Filed at: 02/04/2025 1041                            History of Present Illness       Chief Complaint   Patient presents with    Flank Pain     Pt c/o right sided flank pain that radiates to the front. Was seen at urgent care and was given Macrobid.        Past Medical History:   Diagnosis Date    COPD (chronic obstructive pulmonary disease) (HCC)     Diabetes mellitus (HCC)     Hypertension     Pulmonary emboli (HCC)       Past Surgical History:   Procedure Laterality Date    APPENDECTOMY      BACK SURGERY      EYE SURGERY      LAMINECTOMY        Family History   Problem Relation Age of Onset    Cancer Father       Social History     Tobacco Use    Smoking status: Never     Passive exposure: Never    Smokeless tobacco: Never   Vaping Use    Vaping status: Never Used   Substance Use Topics    Alcohol use: Never    Drug use: Never      E-Cigarette/Vaping    E-Cigarette Use Never User       E-Cigarette/Vaping Substances      I have reviewed and agree with the  history as documented.     This is a 64-year-old male presenting to the emergency department today for evaluation of abdominal pain back pain constipation.  Symptoms he states have been ongoing for about a month.  He states he was placed on Macrobid a few days ago by an urgent care however that urinalysis did not reveal any leukocytes or nitrates.  He has been taking them.    The pain is in the right low back rating to the right side of the abdomen.  He states that he has no prior abdominal surgical history however when questioned regarding surgical scars he states that he has had his appendix and gallbladder removed in the past as well as a left inguinal hernia repair.  He states that he has not been going to the bathroom informs of stool normally for about 7 days he feels that he may be constipated.  No diarrhea.  He denies urinary urgency frequency or burning.      Flank Pain  Associated symptoms: constipation    Associated symptoms: no chest pain, no chills, no cough, no diarrhea, no dysuria, no fever, no hematuria, no nausea, no shortness of breath, no sore throat and no vomiting        Review of Systems   Constitutional:  Negative for chills and fever.   HENT:  Negative for ear pain and sore throat.    Eyes:  Negative for pain and visual disturbance.   Respiratory:  Negative for cough and shortness of breath.    Cardiovascular:  Negative for chest pain and palpitations.   Gastrointestinal:  Positive for abdominal pain and constipation. Negative for diarrhea, nausea, rectal pain and vomiting.   Genitourinary:  Positive for flank pain. Negative for dysuria and hematuria.   Musculoskeletal:  Positive for back pain. Negative for arthralgias.   Skin:  Negative for color change and rash.   Neurological:  Negative for seizures and syncope.   All other systems reviewed and are negative.          Objective       ED Triage Vitals [02/04/25 1042]   Temperature Pulse Blood Pressure Respirations SpO2 Patient Position -  Orthostatic VS   97.6 °F (36.4 °C) 73 141/82 18 95 % Lying      Temp Source Heart Rate Source BP Location FiO2 (%) Pain Score    Temporal Monitor Left arm -- 8      Vitals      Date and Time Temp Pulse SpO2 Resp BP Pain Score FACES Pain Rating User   02/04/25 1042 97.6 °F (36.4 °C) 73 95 % 18 141/82 8 -- BMD            Physical Exam  Vitals reviewed.   Constitutional:       General: He is not in acute distress.     Appearance: Normal appearance. He is not ill-appearing, toxic-appearing or diaphoretic.   HENT:      Head: Normocephalic and atraumatic.      Right Ear: External ear normal.      Left Ear: External ear normal.   Eyes:      General: No scleral icterus.        Right eye: No discharge.         Left eye: No discharge.      Extraocular Movements: Extraocular movements intact.      Conjunctiva/sclera: Conjunctivae normal.   Cardiovascular:      Rate and Rhythm: Normal rate.      Pulses: Normal pulses.   Pulmonary:      Effort: Pulmonary effort is normal. No respiratory distress.      Breath sounds: No stridor.   Abdominal:      General: Abdomen is flat. There is no distension.      Palpations: There is no mass.      Tenderness: There is abdominal tenderness. There is no guarding or rebound.      Hernia: No hernia is present.   Musculoskeletal:         General: No deformity or signs of injury.      Cervical back: Normal range of motion. No rigidity.   Skin:     General: Skin is warm.      Coloration: Skin is not jaundiced.      Findings: No lesion or rash.   Neurological:      General: No focal deficit present.      Mental Status: He is alert and oriented to person, place, and time. Mental status is at baseline.      Gait: Gait normal.   Psychiatric:         Mood and Affect: Mood normal.         Thought Content: Thought content normal.         Judgment: Judgment normal.         Results Reviewed       Procedure Component Value Units Date/Time    UA (URINE) with reflex to Scope [[id number]]  (Abnormal) Collected:  02/04/25 1226    Lab Status: Final result Specimen: Urine, Clean Catch Updated: 02/04/25 1233     Color, UA Yellow     Clarity, UA Clear     Specific Gravity, UA <1.005     pH, UA 7.0     Leukocytes, UA Negative     Nitrite, UA Negative     Protein, UA Negative mg/dl      Glucose, UA Negative mg/dl      Ketones, UA Negative mg/dl      Urobilinogen, UA <2.0 mg/dl      Bilirubin, UA Negative     Occult Blood, UA Negative    Comprehensive metabolic panel [[id number]]  (Abnormal) Collected: 02/04/25 1128    Lab Status: Final result Specimen: Blood from Arm, Left Updated: 02/04/25 1153     Sodium 141 mmol/L      Potassium 3.4 mmol/L      Chloride 106 mmol/L      CO2 27 mmol/L      ANION GAP 8 mmol/L      BUN 11 mg/dL      Creatinine 0.98 mg/dL      Glucose 129 mg/dL      Calcium 9.2 mg/dL      AST 16 U/L      ALT 14 U/L      Alkaline Phosphatase 146 U/L      Total Protein 7.1 g/dL      Albumin 4.3 g/dL      Total Bilirubin 1.19 mg/dL      eGFR 81 ml/min/1.73sq m     Narrative:      National Kid[employer] Foundation guidelines for Chronic Kidney Disease (CKD):     Stage 1 with normal or high GFR (GFR > 90 mL/min/1.73 square meters)    Stage 2 Mild CKD (GFR = 60-89 mL/min/1.73 square meters)    Stage 3A Moderate CKD (GFR = 45-59 mL/min/1.73 square meters)    Stage 3B Moderate CKD (GFR = 30-44 mL/min/1.73 square meters)    Stage 4 Severe CKD (GFR = 15-29 mL/min/1.73 square meters)    Stage 5 End Stage CKD (GFR <15 mL/min/1.73 square meters)  Note: GFR calculation is accurate only with a steady state creatinine    Lipase [[id number]]  (Normal) Collected: 02/04/25 1128    Lab Status: Final result Specimen: Blood from Arm, Left Updated: 02/04/25 1153     Lipase 16 u/L     CBC and differential [[id number]]  (Abnormal) Collected: 02/04/25 1128    Lab Status: Final result Specimen: Blood from Arm, Left Updated: 02/04/25 1138     WBC 8.49 Thousand/uL      RBC 4.44 Million/uL      Hemoglobin 13.4 g/dL      Hematocrit 41.6 %      MCV  94 fL      MCH 30.2 pg      MCHC 32.2 g/dL      RDW 14.4 %      MPV 10.6 fL      Platelets 215 Thousands/uL      nRBC 0 /100 WBCs      Segmented % 80 %      Immature Grans % 0 %      Lymphocytes % 13 %      Monocytes % 5 %      Eosinophils Relative 1 %      Basophils Relative 1 %      Absolute Neutrophils 6.70 Thousands/µL      Absolute Immature Grans 0.02 Thousand/uL      Absolute Lymphocytes 1.13 Thousands/µL      Absolute Monocytes 0.45 Thousand/µL      Eosinophils Absolute 0.11 Thousand/µL      Basophils Absolute 0.08 Thousands/µL             CT abdomen pelvis with contrast   Final Interpretation by Puneet Silva MD (02/04 1216)      No acute findings in the abdomen or pelvis.      Urinary bladder wall thickening which could reflect sequela of chronic bladder outlet obstruction versus cystitis.      Persistent small bilateral pleural effusions.         Workstation performed: [id number]             Procedures    ED Medication and Procedure Management   Prior to Admission Medications   Prescriptions Last Dose Informant Patient Reported? Taking?   FLUoxetine (PROzac) 40 MG capsule 2/4/2025  Yes Yes   Sig: Take 40 mg by mouth daily   FeroSul 325 (65 Fe) MG tablet 2/4/2025  Yes Yes   Ozempic, 0.25 or 0.5 MG/DOSE, 2 MG/3ML injection pen   Yes No   Tradjenta 5 MG TABS 2/4/2025  Yes Yes   acetaminophen (TYLENOL) 325 mg tablet   No No   Sig: Take 2 tablets (650 mg total) by mouth every 4 (four) hours as needed for mild pain   aspirin 81 mg chewable tablet 2/4/2025  No Yes   Sig: Chew 1 tablet (81 mg total) daily Do not start before April 28, 2023.   atorvastatin (LIPITOR) 20 mg tablet 2/4/2025  Yes Yes   Sig: Take 20 mg by mouth daily   celecoxib (CeleBREX) 200 mg capsule 2/4/2025  Yes Yes   doxazosin (CARDURA) 2 mg tablet 2/3/2025  Yes Yes   Sig: Take 4 mg by mouth daily at bedtime   labetalol (NORMODYNE) 100 mg tablet 2/4/2025  Yes Yes   Sig: Take 150 mg by mouth 2 (two) times a day   losartan (COZAAR) 50 mg  tablet 2/4/2025  Yes Yes   Sig: Take 50 mg by mouth daily   nitrofurantoin (MACROBID) 100 mg capsule   No No   Sig: Take 1 capsule (100 mg total) by mouth 2 (two) times a day for 5 days   omeprazole (PriLOSEC) 20 mg delayed release capsule 2/4/2025  Yes Yes   Sig: Take 20 mg by mouth daily   oxyCODONE (ROXICODONE) 5 immediate release tablet   No No   Sig: You may take 2.5 mg (0.5 tab) for moderate pain or 5 mg (1 tab) for severe pain, every 4 hours, as needed.   Patient not taking: Reported on 2/1/2025   potassium chloride (MICRO-K) 10 MEQ CR capsule 2/4/2025  Yes Yes   pregabalin (LYRICA) 50 mg capsule 2/4/2025  Yes Yes   Sig: Take 50 mg by mouth daily   senna-docusate sodium (SENOKOT S) 8.6-50 mg per tablet 2/3/2025  No Yes   Sig: Take 1 tablet by mouth daily at bedtime   traZODone (DESYREL) 100 mg tablet 2/3/2025  Yes Yes   Sig: Take 100 mg by mouth daily at bedtime      Facility-Administered Medications: None     Patient's Medications   Discharge Prescriptions    No medications on file     No discharge procedures on file.  ED SEPSIS DOCUMENTATION   Time reflects when diagnosis was documented in both MDM as applicable and the Disposition within this note       Time User Action Codes Description Comment    2/4/2025 12:22 PM Tello Faye [R10.9] Flank pain     2/4/2025 12:22 PM Tello Faye [E87.6] Hypokalemia                  Tello Faye PA-C  02/04/25 1251